# Patient Record
Sex: FEMALE | Race: ASIAN | Employment: UNEMPLOYED | URBAN - METROPOLITAN AREA
[De-identification: names, ages, dates, MRNs, and addresses within clinical notes are randomized per-mention and may not be internally consistent; named-entity substitution may affect disease eponyms.]

---

## 2020-02-05 ENCOUNTER — HOSPITAL ENCOUNTER (EMERGENCY)
Age: 45
Discharge: HOME | End: 2020-02-05
Attending: EMERGENCY MEDICINE
Payer: COMMERCIAL

## 2020-02-05 ENCOUNTER — APPOINTMENT (OUTPATIENT)
Dept: GENERAL RADIOLOGY | Age: 45
End: 2020-02-05
Payer: COMMERCIAL

## 2020-02-05 VITALS
RESPIRATION RATE: 16 BRPM | OXYGEN SATURATION: 100 % | HEART RATE: 78 BPM | WEIGHT: 135 LBS | DIASTOLIC BLOOD PRESSURE: 111 MMHG | BODY MASS INDEX: 25.49 KG/M2 | TEMPERATURE: 98.1 F | SYSTOLIC BLOOD PRESSURE: 179 MMHG | HEIGHT: 61 IN

## 2020-02-05 LAB
ANION GAP SERPL CALCULATED.3IONS-SCNC: 15 MMOL/L (ref 3–16)
BANDED NEUTROPHILS RELATIVE PERCENT: 2 % (ref 0–7)
BASOPHILS ABSOLUTE: 0.1 K/UL (ref 0–0.2)
BASOPHILS RELATIVE PERCENT: 1 %
BUN BLDV-MCNC: 4 MG/DL (ref 7–20)
CALCIUM SERPL-MCNC: 8.6 MG/DL (ref 8.3–10.6)
CHLORIDE BLD-SCNC: 104 MMOL/L (ref 99–110)
CO2: 22 MMOL/L (ref 21–32)
CREAT SERPL-MCNC: 0.5 MG/DL (ref 0.6–1.1)
EKG ATRIAL RATE: 81 BPM
EKG DIAGNOSIS: NORMAL
EKG P AXIS: 9 DEGREES
EKG P-R INTERVAL: 124 MS
EKG Q-T INTERVAL: 384 MS
EKG QRS DURATION: 74 MS
EKG QTC CALCULATION (BAZETT): 446 MS
EKG R AXIS: 1 DEGREES
EKG T AXIS: 1 DEGREES
EKG VENTRICULAR RATE: 81 BPM
EOSINOPHILS ABSOLUTE: 1.2 K/UL (ref 0–0.6)
EOSINOPHILS RELATIVE PERCENT: 11 %
GFR AFRICAN AMERICAN: >60
GFR NON-AFRICAN AMERICAN: >60
GLUCOSE BLD-MCNC: 139 MG/DL (ref 70–99)
HCT VFR BLD CALC: 44.8 % (ref 36–48)
HEMOGLOBIN: 15.1 G/DL (ref 12–16)
LYMPHOCYTES ABSOLUTE: 3.6 K/UL (ref 1–5.1)
LYMPHOCYTES RELATIVE PERCENT: 33 %
MCH RBC QN AUTO: 29.9 PG (ref 26–34)
MCHC RBC AUTO-ENTMCNC: 33.7 G/DL (ref 31–36)
MCV RBC AUTO: 88.7 FL (ref 80–100)
MONOCYTES ABSOLUTE: 0.4 K/UL (ref 0–1.3)
MONOCYTES RELATIVE PERCENT: 4 %
NEUTROPHILS ABSOLUTE: 5.6 K/UL (ref 1.7–7.7)
NEUTROPHILS RELATIVE PERCENT: 49 %
PDW BLD-RTO: 13.4 % (ref 12.4–15.4)
PLATELET # BLD: 313 K/UL (ref 135–450)
PMV BLD AUTO: 9.6 FL (ref 5–10.5)
POTASSIUM SERPL-SCNC: 4 MMOL/L (ref 3.5–5.1)
RBC # BLD: 5.05 M/UL (ref 4–5.2)
RBC # BLD: NORMAL 10*6/UL
SODIUM BLD-SCNC: 141 MMOL/L (ref 136–145)
TROPONIN: <0.01 NG/ML
WBC # BLD: 10.9 K/UL (ref 4–11)

## 2020-02-05 PROCEDURE — 80048 BASIC METABOLIC PNL TOTAL CA: CPT

## 2020-02-05 PROCEDURE — 84484 ASSAY OF TROPONIN QUANT: CPT

## 2020-02-05 PROCEDURE — 93010 ELECTROCARDIOGRAM REPORT: CPT | Performed by: INTERNAL MEDICINE

## 2020-02-05 PROCEDURE — 99284 EMERGENCY DEPT VISIT MOD MDM: CPT

## 2020-02-05 PROCEDURE — 93005 ELECTROCARDIOGRAM TRACING: CPT | Performed by: EMERGENCY MEDICINE

## 2020-02-05 PROCEDURE — 85025 COMPLETE CBC W/AUTO DIFF WBC: CPT

## 2020-02-05 PROCEDURE — 71046 X-RAY EXAM CHEST 2 VIEWS: CPT

## 2020-02-05 RX ORDER — AMLODIPINE BESYLATE AND BENAZEPRIL HYDROCHLORIDE 10; 40 MG/1; MG/1
CAPSULE ORAL DAILY
Status: ON HOLD | COMMUNITY
Start: 2020-01-15 | End: 2020-10-14 | Stop reason: HOSPADM

## 2020-02-05 RX ORDER — AMOXICILLIN AND CLAVULANATE POTASSIUM 875; 125 MG/1; MG/1
1 TABLET, FILM COATED ORAL 2 TIMES DAILY
Qty: 20 TABLET | Refills: 0 | Status: SHIPPED | OUTPATIENT
Start: 2020-02-05 | End: 2020-02-15

## 2020-02-05 RX ORDER — HYDROCHLOROTHIAZIDE 25 MG/1
25 TABLET ORAL DAILY
Status: ON HOLD | COMMUNITY
Start: 2020-01-14 | End: 2020-10-14 | Stop reason: HOSPADM

## 2020-02-05 RX ORDER — FLUTICASONE PROPIONATE 50 MCG
2 SPRAY, SUSPENSION (ML) NASAL DAILY
Qty: 1 BOTTLE | Refills: 0 | Status: SHIPPED | OUTPATIENT
Start: 2020-02-05

## 2020-02-05 RX ORDER — DEXTROMETHORPHAN POLISTIREX 30 MG/5ML
60 SUSPENSION ORAL 2 TIMES DAILY PRN
Qty: 1 BOTTLE | Refills: 0 | Status: SHIPPED | OUTPATIENT
Start: 2020-02-05 | End: 2020-02-15

## 2020-02-05 ASSESSMENT — PAIN DESCRIPTION - DESCRIPTORS: DESCRIPTORS: ACHING

## 2020-02-05 ASSESSMENT — PAIN DESCRIPTION - PAIN TYPE: TYPE: ACUTE PAIN

## 2020-02-05 ASSESSMENT — PAIN DESCRIPTION - LOCATION: LOCATION: CHEST

## 2020-02-05 NOTE — ED PROVIDER NOTES
eMERGENCY dEPARTMENT eNCOUnter      PtName: Ivonne Nagy  MRN: [id number]  Birthdate 1975  Date of evaluation: 2/5/2020  Provider: Fe [address]       Chief Complaint   Patient presents with    Fever     c/o fever, cough and headache. x month          HISTORY OF PRESENT ILLNESS   (Location/Symptom, Timing/Onset,Context/Setting, Quality, Duration, Modifying Factors, Severity) Note limiting factors. HPI    Ivonne Nagy is a 38 y.o. female who presents to the emergency department chief complaint of nasal congestion, sinus pressure and dry cough and nasal drainage worse at night over the past month. No associated fever. No chest pain. Nursing Notes were reviewed. REVIEW OF SYSTEMS    (2+ forlevel 4; 10+ for level 5)     Review of Systems  See hpi for further details. Complete 10 point review of all systems performed and is otherwise negative unless noted above. PAST MEDICAL HISTORY     Past Medical History:   Diagnosis Date    GERD (gastroesophageal reflux disease)     Hyperlipidemia     Hypertension        SURGICAL HISTORY     No past surgical history on file. CURRENT MEDICATIONS       Previous Medications    AMLODIPINE-BENAZEPRIL (LOTREL) 10-40 MG PER CAPSULE    daily    HYDROCHLOROTHIAZIDE (HYDRODIURIL) 25 MG TABLET    Take 25 mg by mouth daily    OMEPRAZOLE (PRILOSEC) 20 MG CAPSULE    Take 20 mg by mouth daily    ONDANSETRON (ZOFRAN) 4 MG TABLET    Take 1 tablet by mouth every 8 hours as needed for Nausea    PANTOPRAZOLE (PROTONIX) 20 MG TABLET    Take 2 tablets by mouth daily    TRAMADOL (ULTRAM) 50 MG TABLET    Take 1 tablet by mouth every 6 hours as needed for Pain       ALLERGIES     Asa [aspirin]    FAMILY HISTORY     No family history on file.        SOCIAL HISTORY       Social History     Socioeconomic History    Marital status:      Spouse name: Not on file    Number of children: Not on file    Years of education: Not on file    Highest education level: Not on file   Occupational History    Not on file   Social Needs    Financial resource strain: Not on file    Food insecurity:     Worry: Not on file     Inability: Not on file    Transportation needs:     Medical: Not on file     Non-medical: Not on file   Tobacco Use    Smoking status: Never Smoker   Substance and Sexual Activity    Alcohol use: No    Drug use: No    Sexual activity: Not on file   Lifestyle    Physical activity:     Days per week: Not on file     Minutes per session: Not on file    Stress: Not on file   Relationships    Social connections:     Talks on phone: Not on file     Gets together: Not on file     Attends [employer] service: Not on file     Active member of club or organization: Not on file     Attends meetings of clubs or organizations: Not on file     Relationship status: Not on file    Intimate partner violence:     Fear of current or ex partner: Not on file     Emotionally abused: Not on file     Physically abused: Not on file     Forced sexual activity: Not on file   Other Topics Concern    Not on file   Social History Narrative    Not on file       SCREENINGS           PHYSICAL EXAM    (5+ for level 4, 8+ for level 5)     ED Triage Vitals   BP Temp Temp Source Pulse Resp SpO2 Height Weight   02/05/20 0912 02/05/20 0910 02/05/20 0910 02/05/20 0910 02/05/20 0910 02/05/20 0910 02/05/20 0910 02/05/20 0910   (!) 185/105 98.1 °F (36.7 °C) Oral 79 22 98 % 5' 1\" (1.549 m) 135 lb (61.2 kg)       Physical Exam  Vitals signs and nursing note reviewed. Constitutional:       General: She is not in acute distress. Appearance: Normal appearance. She is not toxic-appearing or diaphoretic. HENT:      Head: Normocephalic and atraumatic. Right Ear: Tympanic membrane, ear canal and external ear normal.      Left Ear: Tympanic membrane, ear canal and external ear normal.      Nose: Congestion and rhinorrhea present.       Comments: Boggy inferior turbinates Mouth/Throat:      Mouth: Mucous membranes are moist.      Pharynx: Oropharynx is clear. No oropharyngeal exudate or posterior oropharyngeal erythema. Comments: Postnasal drip present  Eyes:      General: No scleral icterus. Right eye: No discharge. Left eye: No discharge. Extraocular Movements: Extraocular movements intact. Conjunctiva/sclera: Conjunctivae normal.      Pupils: Pupils are equal, round, and reactive to light. Neck:      Musculoskeletal: Normal range of motion and neck supple. Cardiovascular:      Rate and Rhythm: Normal rate and regular rhythm. Pulses: Normal pulses. Heart sounds: Normal heart sounds. No murmur. No friction rub. No gallop. Pulmonary:      Effort: Pulmonary effort is normal. No respiratory distress. Breath sounds: Normal breath sounds. No stridor. No wheezing, rhonchi or rales. Chest:      Chest wall: No tenderness. Abdominal:      General: Abdomen is flat. There is no distension. Tenderness: There is no abdominal tenderness. Musculoskeletal: Normal range of motion. General: No swelling, tenderness or deformity. Right lower leg: No edema. Left lower leg: No edema. Skin:     General: Skin is warm and dry. Capillary Refill: Capillary refill takes less than 2 seconds. Coloration: Skin is not jaundiced or pale. Findings: No bruising, erythema or rash. Neurological:      General: No focal deficit present. Mental Status: She is alert and oriented to person, place, and time. Cranial Nerves: No cranial nerve deficit. Sensory: No sensory deficit. Motor: No weakness. Psychiatric:         Mood and Affect: Mood normal.         Behavior: Behavior normal.         Thought Content:  Thought content normal.         Judgment: Judgment normal.         DIAGNOSTIC RESULTS     EKG (Per Emergency Physician):   EKG interpretation by ED physician: Normal axis, normal sinus rhythm at a rate

## 2020-10-11 ENCOUNTER — HOSPITAL ENCOUNTER (INPATIENT)
Age: 45
LOS: 1 days | Discharge: HOME | DRG: 111 | End: 2020-10-14
Attending: STUDENT IN AN ORGANIZED HEALTH CARE EDUCATION/TRAINING PROGRAM | Admitting: FAMILY MEDICINE
Payer: COMMERCIAL

## 2020-10-11 ENCOUNTER — APPOINTMENT (OUTPATIENT)
Dept: CT IMAGING | Age: 45
DRG: 111 | End: 2020-10-11
Payer: COMMERCIAL

## 2020-10-11 ENCOUNTER — APPOINTMENT (OUTPATIENT)
Dept: GENERAL RADIOLOGY | Age: 45
DRG: 111 | End: 2020-10-11
Payer: COMMERCIAL

## 2020-10-11 PROBLEM — R42 DIZZINESS: Status: ACTIVE | Noted: 2020-10-11

## 2020-10-11 LAB
A/G RATIO: 1.5 (ref 1.1–2.2)
ALBUMIN SERPL-MCNC: 4.5 G/DL (ref 3.4–5)
ALP BLD-CCNC: 91 U/L (ref 40–129)
ALT SERPL-CCNC: 52 U/L (ref 10–40)
ANION GAP SERPL CALCULATED.3IONS-SCNC: 15 MMOL/L (ref 3–16)
AST SERPL-CCNC: 54 U/L (ref 15–37)
BACTERIA: ABNORMAL /HPF
BASE EXCESS VENOUS: -5.8 MMOL/L (ref -3–3)
BASOPHILS ABSOLUTE: 0.1 K/UL (ref 0–0.2)
BASOPHILS RELATIVE PERCENT: 0.6 %
BILIRUB SERPL-MCNC: 0.9 MG/DL (ref 0–1)
BILIRUBIN URINE: NEGATIVE
BLOOD, URINE: ABNORMAL
BUN BLDV-MCNC: 6 MG/DL (ref 7–20)
CALCIUM SERPL-MCNC: 9.4 MG/DL (ref 8.3–10.6)
CARBOXYHEMOGLOBIN: 1.9 % (ref 0–1.5)
CHLORIDE BLD-SCNC: 101 MMOL/L (ref 99–110)
CLARITY: CLEAR
CO2: 18 MMOL/L (ref 21–32)
COLOR: YELLOW
CREAT SERPL-MCNC: 0.6 MG/DL (ref 0.6–1.1)
EOSINOPHILS ABSOLUTE: 0.2 K/UL (ref 0–0.6)
EOSINOPHILS RELATIVE PERCENT: 1.9 %
EPITHELIAL CELLS, UA: ABNORMAL /HPF (ref 0–5)
GFR AFRICAN AMERICAN: >60
GFR NON-AFRICAN AMERICAN: >60
GLOBULIN: 3 G/DL
GLUCOSE BLD-MCNC: 172 MG/DL (ref 70–99)
GLUCOSE URINE: NEGATIVE MG/DL
HCG QUALITATIVE: NEGATIVE
HCO3 VENOUS: 18.9 MMOL/L (ref 23–29)
HCT VFR BLD CALC: 40.9 % (ref 36–48)
HEMOGLOBIN: 14.1 G/DL (ref 12–16)
INR BLD: 0.96 (ref 0.86–1.14)
KETONES, URINE: NEGATIVE MG/DL
LACTIC ACID, SEPSIS: 2.8 MMOL/L (ref 0.4–1.9)
LACTIC ACID, SEPSIS: 2.9 MMOL/L (ref 0.4–1.9)
LEUKOCYTE ESTERASE, URINE: ABNORMAL
LIPASE: 30 U/L (ref 13–60)
LYMPHOCYTES ABSOLUTE: 2.8 K/UL (ref 1–5.1)
LYMPHOCYTES RELATIVE PERCENT: 22.2 %
MCH RBC QN AUTO: 30.9 PG (ref 26–34)
MCHC RBC AUTO-ENTMCNC: 34.5 G/DL (ref 31–36)
MCV RBC AUTO: 89.7 FL (ref 80–100)
METHEMOGLOBIN VENOUS: 0.6 %
MICROSCOPIC EXAMINATION: YES
MONOCYTES ABSOLUTE: 0.6 K/UL (ref 0–1.3)
MONOCYTES RELATIVE PERCENT: 4.8 %
NEUTROPHILS ABSOLUTE: 8.9 K/UL (ref 1.7–7.7)
NEUTROPHILS RELATIVE PERCENT: 70.5 %
NITRITE, URINE: NEGATIVE
O2 CONTENT, VEN: 19 VOL %
O2 SAT, VEN: 99 %
O2 THERAPY: ABNORMAL
PCO2, VEN: 33.9 MMHG (ref 40–50)
PDW BLD-RTO: 12.9 % (ref 12.4–15.4)
PH UA: 6 (ref 5–8)
PH VENOUS: 7.36 (ref 7.35–7.45)
PLATELET # BLD: 290 K/UL (ref 135–450)
PMV BLD AUTO: 9.3 FL (ref 5–10.5)
PO2, VEN: 102 MMHG (ref 25–40)
POTASSIUM REFLEX MAGNESIUM: 3.7 MMOL/L (ref 3.5–5.1)
PRO-BNP: 19 PG/ML (ref 0–124)
PROTEIN UA: NEGATIVE MG/DL
PROTHROMBIN TIME: 11.1 SEC (ref 10–13.2)
RBC # BLD: 4.56 M/UL (ref 4–5.2)
RBC UA: ABNORMAL /HPF (ref 0–4)
SODIUM BLD-SCNC: 134 MMOL/L (ref 136–145)
SPECIFIC GRAVITY UA: <1.005 (ref 1–1.03)
TCO2 CALC VENOUS: 45 MMOL/L
TOTAL PROTEIN: 7.5 G/DL (ref 6.4–8.2)
TROPONIN: <0.01 NG/ML
TROPONIN: <0.01 NG/ML
URINE REFLEX TO CULTURE: ABNORMAL
URINE TYPE: ABNORMAL
UROBILINOGEN, URINE: 0.2 E.U./DL
WBC # BLD: 12.6 K/UL (ref 4–11)
WBC UA: ABNORMAL /HPF (ref 0–5)

## 2020-10-11 PROCEDURE — 85025 COMPLETE CBC W/AUTO DIFF WBC: CPT

## 2020-10-11 PROCEDURE — 83690 ASSAY OF LIPASE: CPT

## 2020-10-11 PROCEDURE — 84703 CHORIONIC GONADOTROPIN ASSAY: CPT

## 2020-10-11 PROCEDURE — G0378 HOSPITAL OBSERVATION PER HR: HCPCS

## 2020-10-11 PROCEDURE — 87040 BLOOD CULTURE FOR BACTERIA: CPT

## 2020-10-11 PROCEDURE — 82803 BLOOD GASES ANY COMBINATION: CPT

## 2020-10-11 PROCEDURE — 6370000000 HC RX 637 (ALT 250 FOR IP): Performed by: PHYSICIAN ASSISTANT

## 2020-10-11 PROCEDURE — 71045 X-RAY EXAM CHEST 1 VIEW: CPT

## 2020-10-11 PROCEDURE — 96374 THER/PROPH/DIAG INJ IV PUSH: CPT

## 2020-10-11 PROCEDURE — 84484 ASSAY OF TROPONIN QUANT: CPT

## 2020-10-11 PROCEDURE — 70450 CT HEAD/BRAIN W/O DYE: CPT

## 2020-10-11 PROCEDURE — 2580000003 HC RX 258: Performed by: PHYSICIAN ASSISTANT

## 2020-10-11 PROCEDURE — 6370000000 HC RX 637 (ALT 250 FOR IP): Performed by: FAMILY MEDICINE

## 2020-10-11 PROCEDURE — 94760 N-INVAS EAR/PLS OXIMETRY 1: CPT

## 2020-10-11 PROCEDURE — 71260 CT THORAX DX C+: CPT

## 2020-10-11 PROCEDURE — 80053 COMPREHEN METABOLIC PANEL: CPT

## 2020-10-11 PROCEDURE — 85610 PROTHROMBIN TIME: CPT

## 2020-10-11 PROCEDURE — 6360000004 HC RX CONTRAST MEDICATION: Performed by: PHYSICIAN ASSISTANT

## 2020-10-11 PROCEDURE — 6360000002 HC RX W HCPCS: Performed by: PHYSICIAN ASSISTANT

## 2020-10-11 PROCEDURE — 81001 URINALYSIS AUTO W/SCOPE: CPT

## 2020-10-11 PROCEDURE — 83880 ASSAY OF NATRIURETIC PEPTIDE: CPT

## 2020-10-11 PROCEDURE — 83605 ASSAY OF LACTIC ACID: CPT

## 2020-10-11 PROCEDURE — 2580000003 HC RX 258: Performed by: FAMILY MEDICINE

## 2020-10-11 PROCEDURE — 99285 EMERGENCY DEPT VISIT HI MDM: CPT

## 2020-10-11 PROCEDURE — 96375 TX/PRO/DX INJ NEW DRUG ADDON: CPT

## 2020-10-11 PROCEDURE — 93005 ELECTROCARDIOGRAM TRACING: CPT | Performed by: PHYSICIAN ASSISTANT

## 2020-10-11 RX ORDER — DIPHENHYDRAMINE HYDROCHLORIDE 50 MG/ML
25 INJECTION INTRAMUSCULAR; INTRAVENOUS ONCE
Status: COMPLETED | OUTPATIENT
Start: 2020-10-11 | End: 2020-10-11

## 2020-10-11 RX ORDER — LORATADINE 10 MG/1
10 CAPSULE, LIQUID FILLED ORAL DAILY
COMMUNITY

## 2020-10-11 RX ORDER — TOPIRAMATE 100 MG/1
100 TABLET, FILM COATED ORAL 2 TIMES DAILY
COMMUNITY

## 2020-10-11 RX ORDER — AMLODIPINE BESYLATE 10 MG/1
10 TABLET ORAL DAILY
Status: ON HOLD | COMMUNITY
End: 2020-10-14 | Stop reason: HOSPADM

## 2020-10-11 RX ORDER — SODIUM CHLORIDE 0.9 % (FLUSH) 0.9 %
10 SYRINGE (ML) INJECTION EVERY 12 HOURS SCHEDULED
Status: DISCONTINUED | OUTPATIENT
Start: 2020-10-11 | End: 2020-10-14 | Stop reason: HOSPADM

## 2020-10-11 RX ORDER — LANOLIN ALCOHOL/MO/W.PET/CERES
3 CREAM (GRAM) TOPICAL NIGHTLY PRN
COMMUNITY

## 2020-10-11 RX ORDER — ASPIRIN 81 MG/1
324 TABLET, CHEWABLE ORAL ONCE
Status: COMPLETED | OUTPATIENT
Start: 2020-10-11 | End: 2020-10-11

## 2020-10-11 RX ORDER — PROMETHAZINE HYDROCHLORIDE 25 MG/1
12.5 TABLET ORAL EVERY 6 HOURS PRN
Status: DISCONTINUED | OUTPATIENT
Start: 2020-10-11 | End: 2020-10-14 | Stop reason: HOSPADM

## 2020-10-11 RX ORDER — AMLODIPINE BESYLATE AND BENAZEPRIL HYDROCHLORIDE 10; 40 MG/1; MG/1
1 CAPSULE ORAL DAILY
Status: DISCONTINUED | OUTPATIENT
Start: 2020-10-12 | End: 2020-10-11 | Stop reason: CLARIF

## 2020-10-11 RX ORDER — ONDANSETRON 2 MG/ML
4 INJECTION INTRAMUSCULAR; INTRAVENOUS ONCE
Status: COMPLETED | OUTPATIENT
Start: 2020-10-11 | End: 2020-10-11

## 2020-10-11 RX ORDER — METOCLOPRAMIDE HYDROCHLORIDE 5 MG/ML
10 INJECTION INTRAMUSCULAR; INTRAVENOUS ONCE
Status: COMPLETED | OUTPATIENT
Start: 2020-10-11 | End: 2020-10-11

## 2020-10-11 RX ORDER — AMLODIPINE BESYLATE 5 MG/1
10 TABLET ORAL DAILY
Status: DISCONTINUED | OUTPATIENT
Start: 2020-10-12 | End: 2020-10-13

## 2020-10-11 RX ORDER — MORPHINE SULFATE 2 MG/ML
2 INJECTION, SOLUTION INTRAMUSCULAR; INTRAVENOUS ONCE
Status: COMPLETED | OUTPATIENT
Start: 2020-10-11 | End: 2020-10-11

## 2020-10-11 RX ORDER — LISINOPRIL 20 MG/1
40 TABLET ORAL DAILY
Status: DISCONTINUED | OUTPATIENT
Start: 2020-10-12 | End: 2020-10-14 | Stop reason: HOSPADM

## 2020-10-11 RX ORDER — OMEPRAZOLE 10 MG/1
20 CAPSULE, DELAYED RELEASE ORAL DAILY
Status: DISCONTINUED | OUTPATIENT
Start: 2020-10-12 | End: 2020-10-11 | Stop reason: CLARIF

## 2020-10-11 RX ORDER — PANTOPRAZOLE SODIUM 40 MG/1
40 TABLET, DELAYED RELEASE ORAL DAILY
Status: DISCONTINUED | OUTPATIENT
Start: 2020-10-12 | End: 2020-10-14 | Stop reason: HOSPADM

## 2020-10-11 RX ORDER — ONDANSETRON 2 MG/ML
4 INJECTION INTRAMUSCULAR; INTRAVENOUS EVERY 6 HOURS PRN
Status: DISCONTINUED | OUTPATIENT
Start: 2020-10-11 | End: 2020-10-14 | Stop reason: HOSPADM

## 2020-10-11 RX ORDER — POLYETHYLENE GLYCOL 3350 17 G/17G
17 POWDER, FOR SOLUTION ORAL DAILY PRN
Status: DISCONTINUED | OUTPATIENT
Start: 2020-10-11 | End: 2020-10-14 | Stop reason: HOSPADM

## 2020-10-11 RX ORDER — 0.9 % SODIUM CHLORIDE 0.9 %
1000 INTRAVENOUS SOLUTION INTRAVENOUS ONCE
Status: COMPLETED | OUTPATIENT
Start: 2020-10-11 | End: 2020-10-11

## 2020-10-11 RX ORDER — AMITRIPTYLINE HYDROCHLORIDE 10 MG/1
25 TABLET, FILM COATED ORAL NIGHTLY
COMMUNITY

## 2020-10-11 RX ORDER — SODIUM CHLORIDE 0.9 % (FLUSH) 0.9 %
10 SYRINGE (ML) INJECTION PRN
Status: DISCONTINUED | OUTPATIENT
Start: 2020-10-11 | End: 2020-10-14 | Stop reason: HOSPADM

## 2020-10-11 RX ORDER — TRAMADOL HYDROCHLORIDE 50 MG/1
50 TABLET ORAL EVERY 4 HOURS PRN
Status: DISCONTINUED | OUTPATIENT
Start: 2020-10-11 | End: 2020-10-14 | Stop reason: HOSPADM

## 2020-10-11 RX ORDER — ACETAMINOPHEN 325 MG/1
650 TABLET ORAL EVERY 6 HOURS PRN
Status: DISCONTINUED | OUTPATIENT
Start: 2020-10-11 | End: 2020-10-14 | Stop reason: HOSPADM

## 2020-10-11 RX ORDER — ACETAMINOPHEN 650 MG/1
650 SUPPOSITORY RECTAL EVERY 6 HOURS PRN
Status: DISCONTINUED | OUTPATIENT
Start: 2020-10-11 | End: 2020-10-14 | Stop reason: HOSPADM

## 2020-10-11 RX ORDER — HYDROCHLOROTHIAZIDE 25 MG/1
25 TABLET ORAL DAILY
Status: DISCONTINUED | OUTPATIENT
Start: 2020-10-12 | End: 2020-10-13

## 2020-10-11 RX ADMIN — TRAMADOL HYDROCHLORIDE 50 MG: 50 TABLET, FILM COATED ORAL at 22:32

## 2020-10-11 RX ADMIN — IOPAMIDOL 75 ML: 755 INJECTION, SOLUTION INTRAVENOUS at 18:06

## 2020-10-11 RX ADMIN — Medication 10 ML: at 21:46

## 2020-10-11 RX ADMIN — MORPHINE SULFATE 2 MG: 2 INJECTION, SOLUTION INTRAMUSCULAR; INTRAVENOUS at 17:33

## 2020-10-11 RX ADMIN — ONDANSETRON 4 MG: 2 INJECTION INTRAMUSCULAR; INTRAVENOUS at 17:30

## 2020-10-11 RX ADMIN — NITROGLYCERIN 1 INCH: 20 OINTMENT TOPICAL at 17:31

## 2020-10-11 RX ADMIN — ASPIRIN 324 MG: 81 TABLET, CHEWABLE ORAL at 17:31

## 2020-10-11 RX ADMIN — METOCLOPRAMIDE 10 MG: 5 INJECTION, SOLUTION INTRAMUSCULAR; INTRAVENOUS at 17:31

## 2020-10-11 RX ADMIN — SODIUM CHLORIDE 1000 ML: 9 INJECTION, SOLUTION INTRAVENOUS at 17:30

## 2020-10-11 RX ADMIN — DIPHENHYDRAMINE HYDROCHLORIDE 25 MG: 50 INJECTION, SOLUTION INTRAMUSCULAR; INTRAVENOUS at 17:30

## 2020-10-11 ASSESSMENT — PAIN DESCRIPTION - PROGRESSION: CLINICAL_PROGRESSION: GRADUALLY IMPROVING

## 2020-10-11 ASSESSMENT — PAIN DESCRIPTION - LOCATION
LOCATION: CHEST
LOCATION: CHEST

## 2020-10-11 ASSESSMENT — ENCOUNTER SYMPTOMS
ABDOMINAL PAIN: 0
VOMITING: 0
CHEST TIGHTNESS: 0
NAUSEA: 0
DIARRHEA: 0
COUGH: 1
SHORTNESS OF BREATH: 1

## 2020-10-11 ASSESSMENT — PAIN DESCRIPTION - DESCRIPTORS
DESCRIPTORS: BURNING
DESCRIPTORS: ACHING

## 2020-10-11 ASSESSMENT — PAIN DESCRIPTION - PAIN TYPE
TYPE: ACUTE PAIN
TYPE: ACUTE PAIN

## 2020-10-11 ASSESSMENT — PAIN SCALES - GENERAL
PAINLEVEL_OUTOF10: 3
PAINLEVEL_OUTOF10: 5
PAINLEVEL_OUTOF10: 5
PAINLEVEL_OUTOF10: 7
PAINLEVEL_OUTOF10: 7

## 2020-10-11 ASSESSMENT — HEART SCORE: ECG: 1

## 2020-10-11 NOTE — ED PROVIDER NOTES
[facility]        Pt Name: Hui Robbins  MRN: [id number]  [place] 1975  Date of evaluation: 10/11/2020  Provider: Tevin Cheung PA-C  PCP: No primary care provider on file. I have seen and evaluated this patient with my supervising physician Luc Jones MD.    [facility]       Chief Complaint   Patient presents with    Dizziness     Pt brought in per [facility] for multiple complaints pt did have Covid in August but over the last 2 weeks she has not felt well, dizziness, sob, fatigue, burning sensation. HISTORY OF PRESENT ILLNESS   (Location, Timing/Onset, Context/Setting, Quality, Duration, Modifying Factors, Severity, Associated Signs and Symptoms)  Note limiting factors. Hui Robbins is a 38 y.o. female presents the emergency department today via [facility] emergency medical services from the home environment with multiple complaints. It is reported that she continues not to feel well. It is reported that she has had ongoing difficulties ever since she was diagnosed with COVID in August.  She is had difficulties with symptoms of fatigue, malaise, chest pain, shortness of breath, intermittent cough, dizziness, it occurred in that timeframe. She is had previous emergency department visits. Is reported the worsening of her symptoms today seem to be relations of shortness of breath. Patient states she is never had a history of DVT and or PE. She denies additional risk factors other than COVID as a potential cause for thromboembolic events. She states that she is having dizziness and unfortunate because of language barrier cannot discern if this is truly vertiginous dizziness and/or lightheadedness. She states that her current level of pain and discomfort rates to be 7 out of 10. Is with the above-mentioned she presents to the ED for evaluation and treatment.   It is reported that she did PANTOPRAZOLE (PROTONIX) 20 MG TABLET    Take 2 tablets by mouth daily    TRAMADOL (ULTRAM) 50 MG TABLET    Take 1 tablet by mouth every 6 hours as needed for Pain         ALLERGIES     Asa [aspirin]    FAMILYHISTORY     History reviewed. No pertinent family history. SOCIAL HISTORY       Social History     Tobacco Use    Smoking status: Never Smoker    Smokeless tobacco: Never Used   Substance Use Topics    Alcohol use: No    Drug use: No       SCREENINGS      Heart Score for chest pain patients  History: Highly Suspicious  ECG: Non-Specifc repolarization disturbance/LBTB/PM  Patient Age: > 39 and < 65 years  *Risk factors for Atherosclerotic disease: Hypercholesterolemia, Hypertension, Obesity  Risk Factors: > 3 Risk factors or history of atherosclerotic disease*  Troponin: < 1X normal limit  Heart Score Total: 6      PHYSICAL EXAM    (up to 7 for level 4, 8 or more for level 5)     ED Triage Vitals [10/11/20 1654]   BP Temp Temp Source Pulse Resp SpO2 Height Weight   129/87 98.7 °F (37.1 °C) Oral 110 19 99 % 5' 1\" (1.549 m) 145 lb (65.8 kg)       Physical Exam  Vitals signs and nursing note reviewed. Constitutional:       General: She is awake. She is not in acute distress. Appearance: Normal appearance. She is well-developed. She is not ill-appearing or diaphoretic. HENT:      Head: Normocephalic and atraumatic. No raccoon eyes, Marcum's sign or laceration. Right Ear: Hearing and external ear normal.      Left Ear: Hearing and external ear normal.      Nose: Nose normal.      Mouth/Throat:      Lips: Pink. Mouth: Mucous membranes are moist.   Eyes:      General: Lids are normal. No scleral icterus. Right eye: No discharge. Left eye: No discharge. Conjunctiva/sclera: Conjunctivae normal.      Pupils: Pupils are equal, round, and reactive to light. Neck:      Musculoskeletal: Normal range of motion. Vascular: No JVD.    Cardiovascular:      Rate and Rhythm: Normal rate and regular rhythm. Heart sounds: No murmur. No friction rub. No gallop. Pulmonary:      Effort: Pulmonary effort is normal. No accessory muscle usage or respiratory distress. Breath sounds: Normal breath sounds. No wheezing, rhonchi or rales. Abdominal:      General: There is no distension. Palpations: Abdomen is soft. Abdomen is not rigid. There is no mass. Tenderness: There is no abdominal tenderness. There is no guarding or rebound. Skin:     General: Skin is warm and dry. Neurological:      General: No focal deficit present. Mental Status: She is alert and oriented to person, place, and time. GCS: GCS eye subscore is 4. GCS verbal subscore is 5. GCS motor subscore is 6. Cranial Nerves: Cranial nerves are intact. No cranial nerve deficit. Sensory: Sensation is intact. No sensory deficit. Motor: Motor function is intact. Coordination: Coordination is intact. Coordination normal.      Gait: Gait is intact. Deep Tendon Reflexes: Reflexes are normal and symmetric. Psychiatric:         Behavior: Behavior normal. Behavior is cooperative.          DIAGNOSTIC RESULTS   LABS:    Labs Reviewed   CBC WITH AUTO DIFFERENTIAL - Abnormal; Notable for the following components:       Result Value    WBC 12.6 (*)     Neutrophils Absolute 8.9 (*)     All other components within normal limits    Narrative:     Performed at:  [facility]  [address] [facility], [address]   Phone [phone]   COMPREHENSIVE METABOLIC PANEL W/ REFLEX TO MG FOR LOW K - Abnormal; Notable for the following components:    Sodium 134 (*)     CO2 18 (*)     Glucose 172 (*)     BUN 6 (*)     ALT 52 (*)     AST 54 (*)     All other components within normal limits    Narrative:     Performed at:  [facility]  [facility] [address]   Phone [phone]   LACTATE, SEPSIS - Abnormal; Notable for the QUALITATIVE    Narrative:     Performed at:  [facility]  [address], [address]   Phone [phone]   LACTATE, SEPSIS       All other labs were within normal range or not returned as of this dictation. EKG: All EKG's are interpreted by the Emergency Department Physician in the absence of a cardiologist.  Please see their note for interpretation of EKG. RADIOLOGY:   Non-plain film images such as CT, Ultrasound and MRI are read by the radiologist. Plain radiographic images are visualized and preliminarily interpreted by the ED Provider with the below findings:        Interpretation per the Radiologist below, if available at the time of this note:    CT CHEST PULMONARY EMBOLISM W CONTRAST   Final Result   Unremarkable appearance of the chest with no evidence of pulmonary embolism   clear lungs. Partially visualized likely hepatomegaly with severe fatty   infiltration. CT HEAD WO CONTRAST   Final Result   Negative noncontrast CT examination of the brain.          XR CHEST PORTABLE   Final Result   Negative portable chest.             PROCEDURES   Unless otherwise noted below, none     Procedures    CRITICAL CARE TIME   N/A    CONSULTS:  None      EMERGENCY DEPARTMENT COURSE and DIFFERENTIAL DIAGNOSIS/MDM:   Vitals:    Vitals:    10/11/20 1654 10/11/20 1746 10/11/20 1830 10/11/20 1900   BP: 129/87 (!) 137/90 110/81 103/72   Pulse: 110 94 92 90   Resp: 19 16 17 21   Temp: 98.7 °F (37.1 °C)      TempSrc: Oral      SpO2: 99% 98% 100% 99%   Weight: 145 lb (65.8 kg)      Height: 5' 1\" (1.549 m)          Patient was given the following medications:  Medications   0.9 % sodium chloride bolus (0 mLs Intravenous Stopped 10/11/20 1924)   ondansetron (ZOFRAN) injection 4 mg (4 mg Intravenous Given 10/11/20 1730)   metoclopramide (REGLAN) injection 10 mg (10 mg Intravenous Given 10/11/20 1731)   diphenhydrAMINE (BENADRYL) injection 25 mg (25 mg Intravenous Given 10/11/20 1730)   aspirin chewable tablet 324 mg (324 mg Oral Given 10/11/20 1731)   nitroglycerin (NITRO-BID) 2 % ointment 1 inch (1 inch Topical Given 10/11/20 1731)   morphine (PF) injection 2 mg (2 mg Intravenous Given 10/11/20 1733)   iopamidol (ISOVUE-370) 76 % injection 75 mL (75 mLs Intravenous Given 10/11/20 1806)           The patient's detailed history of present illness is documented as above. Upon arrival to the emergency department the patient's vital signs are as documented. The patient is noted to be hemodynamically stable and afebrile. Physical examination findings are as above. Access was obtained. Laboratory testing and work-up was initiated. Patient is not allergic to aspirin but instead gets nausea. Because of her chest pain as well as chest pain that was relieved with nitroglycerin she was given the balance of the aspirin here in the ED setting. Initial EKG performed upon arrival demonstrates a sinus tachycardia with a rate of 101. No evidence of acute ST elevation. Intervals and axes as documented. T wave abnormalities noted. Please see attending physician details for further EKG interpretation in comparison. Initial CBC returns with evidence of mild leukocytosis at 12.6 with no evidence of anemia and/or thrombocytopenia. BUN is 6 creatinine 0.6 nonfasting glucose mildly elevated at 172. Her sodium is 134 electrolytes are normal with the exception of CO2 low at 18 but her gap is 15 total bili is normal no evidence of transaminitis and or elevation or alkaline phosphatase. Opponent is less than 0.01.  proBNP is 19. INR is 0.96. Lipase is 30. Initial lactic acid returns elevated at 2.9 yet with no identifiable potential source. Venous blood gas demonstrates no evidence of acidosis. She is not hypercapnic or hypoxic. Urinalysis appears contaminated and does not appear consistent with infection. This will be cultured but will not be treated.   In light of the symptomatology imaging was indicated. CT of the head demonstrates no evidence of acute intercranial abnormality while CT PE study demonstrates no pulmonary embolism. There is evidence of likely hepatomegaly with severe fatty infiltration of the liver. Lung fields are otherwise clear and demonstrating no evidence of consolidation or edema. There is no evidence of effusion or pneumothorax. No identifiable source of the patient's lactic acidosis. She did receive a liter of fluid. This will be repeated for trending purposes but is not concerning at present for sepsis. Patient will be admitted to medical surgical services for ongoing care management the diagnoses below. FINAL IMPRESSION      1. Chest pain, unspecified type relieved by nitroglycerin    2. SIRS (systemic inflammatory response syndrome) (HCC)    3. Lactic acidosis    4. Post viral syndrome    5. Cardiovascular risk factor    6. Fatty liver    7.  Acute nonintractable headache, unspecified headache type          DISPOSITION/PLAN   DISPOSITION Decision To Admit 10/11/2020 07:46:52 PM       (Please note that portions of this note were completed with a voice recognition program.  Efforts were made to edit the dictations but occasionally words are mis-transcribed.)    Jian Hurst PA-C (electronically signed)           Oj Jerome PA-C  10/11/20 1952

## 2020-10-11 NOTE — ED PROVIDER NOTES
AST 54 (*)     All other components within normal limits    Narrative:     Performed at:  [facility] [address],  [place]   Phone [phone]   LACTATE, SEPSIS - Abnormal; Notable for the following components:    Lactic Acid, Sepsis 2.9 (*)     All other components within normal limits    Narrative:     Performed at:  [facility] [address]   Phone [phone]   LACTATE, SEPSIS - Abnormal; Notable for the following components:    Lactic Acid, Sepsis 2.8 (*)     All other components within normal limits    Narrative:     Performed at:  [facility] [address],  [place]   Phone [phone]   BLOOD GAS, VENOUS - Abnormal; Notable for the following components:    pCO2, Jose Alfredo 33.9 (*)     pO2, Jose Alfredo 102.0 (*)     HCO3, Venous 18.9 (*)     Base Excess, Jose Alfredo -5.8 (*)     Carboxyhemoglobin 1.9 (*)     All other components within normal limits    Narrative:     Performed at:  [facility] [address],  [place], [place]   Phone [phone]   URINE RT REFLEX TO CULTURE - Abnormal; Notable for the following components:    Blood, Urine MODERATE (*)     Leukocyte Esterase, Urine TRACE (*)     All other components within normal limits    Narrative:     Performed at:  [facility] [address],  [place]   Phone [phone]   MICROSCOPIC URINALYSIS - Abnormal; Notable for the following components:    Epithelial Cells, UA 6-10 (*)     Bacteria, UA Rare (*)     All other components within normal limits    Narrative:     Performed at:  [facility] [address],  [email or web address]   Phone [phone]   CULTURE, BLOOD 1   CULTURE, BLOOD 2   TROPONIN    Narrative:     Performed at:  [facility]  [address] [place] [address], [place] [employer]   Phone [phone]   BRAIN NATRIURETIC PEPTIDE    Narrative:     Performed at:  [facility]  [address] [facility] [address] [employer]   Phone [phone]   PROTIME-INR    Narrative:     Performed at:  [facility]  [address] [facility] [address] [employer]   Phone [phone]   LIPASE    Narrative:     Performed at:  [facility]  [address] [facility] [address] [employer]   Phone [phone]   HCG, SERUM, QUALITATIVE    Narrative:     Performed at:  [facility]  [address] [facility], [place] [employer]   Phone [phone]   TROPONIN   TROPONIN     Medications   amLODIPine-benazepril (LOTREL) 10-40 MG per capsule 1 capsule (has no administration in time range)   hydroCHLOROthiazide (HYDRODIURIL) tablet 25 mg (has no administration in time range)   omeprazole (PRILOSEC) delayed release capsule 20 mg (has no administration in time range)   pantoprazole (PROTONIX) tablet 40 mg (has no administration in time range)   traMADol (ULTRAM) tablet 50 mg (has no administration in time range)   sodium chloride flush 0.9 % injection 10 mL (has no administration in time range)   sodium chloride flush 0.9 % injection 10 mL (has no administration in time range)   acetaminophen (TYLENOL) tablet 650 mg (has no administration in time range)     Or   acetaminophen (TYLENOL) suppository 650 mg (has no administration in time range)   polyethylene glycol (GLYCOLAX) packet 17 g (has no administration in time range)   promethazine (PHENERGAN) tablet 12.5 mg (has no administration in time range)     Or   ondansetron (ZOFRAN) injection 4 mg (has no administration in time range)   enoxaparin (LOVENOX) injection 40 mg (has no administration in time range)   0.9 % sodium chloride bolus (0 mLs Intravenous Stopped 10/11/20 1924)   ondansetron (ZOFRAN) injection 4 mg (4 mg Intravenous Given 10/11/20 1730)   metoclopramide (REGLAN) injection 10 mg (10 mg Intravenous Given 10/11/20 1731)   diphenhydrAMINE (BENADRYL) injection 25 mg (25 mg Intravenous Given 10/11/20 1730)   aspirin chewable tablet 324 mg (324 mg Oral Given 10/11/20 1731)   nitroglycerin (NITRO-BID) 2 % ointment 1 inch (1 inch Topical Given 10/11/20 1731)   morphine (PF) injection 2 mg (2 mg Intravenous Given 10/11/20 1733)   iopamidol (ISOVUE-370) 76 % injection 75 mL (75 mLs Intravenous Given 10/11/20 1806)     Pt meets SIRS criteria and does have lactate elevation but an infectious source was not found today. We suspect post viral syndrome and will treat with IVF but I do not believe abx are currently indicated. She has no cardiac workup performed in the past and her chest pain today is concerning with resolution after nitro. We will admit for symptomatic control and further cardiac workup. She is agreeable to plan. Patient Referrals:  No follow-up provider specified. Discharge Medications:  Current Discharge Medication List          FINAL IMPRESSION  1. Chest pain, unspecified type relieved by nitroglycerin    2. SIRS (systemic inflammatory response syndrome) (HCC)    3. Lactic acidosis    4. Post viral syndrome    5. Cardiovascular risk factor    6. Fatty liver    7. Acute nonintractable headache, unspecified headache type        Blood pressure 102/67, pulse 88, temperature 98.7 °F (37.1 °C), temperature source Oral, resp. rate 19, height 5' 1\" (1.549 m), weight 145 lb (65.8 kg), SpO2 96 %.      For further details of [facility] emergency department encounter, please see documentation by advanced practice provider       Ryne Dacosta MD  10/11/20 2010

## 2020-10-12 LAB
ANION GAP SERPL CALCULATED.3IONS-SCNC: 12 MMOL/L (ref 3–16)
BUN BLDV-MCNC: 7 MG/DL (ref 7–20)
CALCIUM SERPL-MCNC: 9 MG/DL (ref 8.3–10.6)
CHLORIDE BLD-SCNC: 105 MMOL/L (ref 99–110)
CO2: 22 MMOL/L (ref 21–32)
CREAT SERPL-MCNC: 0.6 MG/DL (ref 0.6–1.1)
EKG ATRIAL RATE: 101 BPM
EKG DIAGNOSIS: NORMAL
EKG P AXIS: 47 DEGREES
EKG P-R INTERVAL: 144 MS
EKG Q-T INTERVAL: 360 MS
EKG QRS DURATION: 80 MS
EKG QTC CALCULATION (BAZETT): 466 MS
EKG R AXIS: 14 DEGREES
EKG T AXIS: 18 DEGREES
EKG VENTRICULAR RATE: 101 BPM
ESTIMATED AVERAGE GLUCOSE: 162.8 MG/DL
GFR AFRICAN AMERICAN: >60
GFR NON-AFRICAN AMERICAN: >60
GLUCOSE BLD-MCNC: 134 MG/DL (ref 70–99)
GLUCOSE BLD-MCNC: 155 MG/DL (ref 70–99)
GLUCOSE BLD-MCNC: 194 MG/DL (ref 70–99)
HBA1C MFR BLD: 7.3 %
HCT VFR BLD CALC: 36.7 % (ref 36–48)
HEMOGLOBIN: 12.8 G/DL (ref 12–16)
LACTIC ACID: 1.4 MMOL/L (ref 0.4–2)
LV EF: 76 %
LVEF MODALITY: NORMAL
MCH RBC QN AUTO: 31.2 PG (ref 26–34)
MCHC RBC AUTO-ENTMCNC: 34.9 G/DL (ref 31–36)
MCV RBC AUTO: 89.4 FL (ref 80–100)
PDW BLD-RTO: 13.1 % (ref 12.4–15.4)
PERFORMED ON: ABNORMAL
PERFORMED ON: ABNORMAL
PLATELET # BLD: 272 K/UL (ref 135–450)
PMV BLD AUTO: 9 FL (ref 5–10.5)
POTASSIUM SERPL-SCNC: 3.4 MMOL/L (ref 3.5–5.1)
RBC # BLD: 4.11 M/UL (ref 4–5.2)
SODIUM BLD-SCNC: 139 MMOL/L (ref 136–145)
TROPONIN: <0.01 NG/ML
WBC # BLD: 9.3 K/UL (ref 4–11)

## 2020-10-12 PROCEDURE — G0378 HOSPITAL OBSERVATION PER HR: HCPCS

## 2020-10-12 PROCEDURE — 6370000000 HC RX 637 (ALT 250 FOR IP): Performed by: FAMILY MEDICINE

## 2020-10-12 PROCEDURE — 83036 HEMOGLOBIN GLYCOSYLATED A1C: CPT

## 2020-10-12 PROCEDURE — 36415 COLL VENOUS BLD VENIPUNCTURE: CPT

## 2020-10-12 PROCEDURE — 6370000000 HC RX 637 (ALT 250 FOR IP): Performed by: INTERNAL MEDICINE

## 2020-10-12 PROCEDURE — 80048 BASIC METABOLIC PNL TOTAL CA: CPT

## 2020-10-12 PROCEDURE — 85027 COMPLETE CBC AUTOMATED: CPT

## 2020-10-12 PROCEDURE — 6360000002 HC RX W HCPCS: Performed by: INTERNAL MEDICINE

## 2020-10-12 PROCEDURE — 84484 ASSAY OF TROPONIN QUANT: CPT

## 2020-10-12 PROCEDURE — 3430000000 HC RX DIAGNOSTIC RADIOPHARMACEUTICAL: Performed by: INTERNAL MEDICINE

## 2020-10-12 PROCEDURE — 96376 TX/PRO/DX INJ SAME DRUG ADON: CPT

## 2020-10-12 PROCEDURE — 93010 ELECTROCARDIOGRAM REPORT: CPT | Performed by: INTERNAL MEDICINE

## 2020-10-12 PROCEDURE — 2580000003 HC RX 258: Performed by: FAMILY MEDICINE

## 2020-10-12 PROCEDURE — 78452 HT MUSCLE IMAGE SPECT MULT: CPT | Performed by: INTERNAL MEDICINE

## 2020-10-12 PROCEDURE — 93017 CV STRESS TEST TRACING ONLY: CPT | Performed by: INTERNAL MEDICINE

## 2020-10-12 PROCEDURE — 6360000002 HC RX W HCPCS: Performed by: FAMILY MEDICINE

## 2020-10-12 PROCEDURE — 96372 THER/PROPH/DIAG INJ SC/IM: CPT

## 2020-10-12 PROCEDURE — 83605 ASSAY OF LACTIC ACID: CPT

## 2020-10-12 PROCEDURE — A9502 TC99M TETROFOSMIN: HCPCS | Performed by: INTERNAL MEDICINE

## 2020-10-12 RX ORDER — INSULIN LISPRO 100 [IU]/ML
0-3 INJECTION, SOLUTION INTRAVENOUS; SUBCUTANEOUS NIGHTLY
Status: DISCONTINUED | OUTPATIENT
Start: 2020-10-12 | End: 2020-10-14 | Stop reason: HOSPADM

## 2020-10-12 RX ORDER — DEXTROSE MONOHYDRATE 25 G/50ML
12.5 INJECTION, SOLUTION INTRAVENOUS PRN
Status: DISCONTINUED | OUTPATIENT
Start: 2020-10-12 | End: 2020-10-14 | Stop reason: HOSPADM

## 2020-10-12 RX ORDER — AMINOPHYLLINE DIHYDRATE 25 MG/ML
100 INJECTION, SOLUTION INTRAVENOUS ONCE
Status: COMPLETED | OUTPATIENT
Start: 2020-10-12 | End: 2020-10-12

## 2020-10-12 RX ORDER — DEXTROSE MONOHYDRATE 50 MG/ML
100 INJECTION, SOLUTION INTRAVENOUS PRN
Status: DISCONTINUED | OUTPATIENT
Start: 2020-10-12 | End: 2020-10-14 | Stop reason: HOSPADM

## 2020-10-12 RX ORDER — NICOTINE POLACRILEX 4 MG
15 LOZENGE BUCCAL PRN
Status: DISCONTINUED | OUTPATIENT
Start: 2020-10-12 | End: 2020-10-14 | Stop reason: HOSPADM

## 2020-10-12 RX ORDER — INSULIN LISPRO 100 [IU]/ML
0-6 INJECTION, SOLUTION INTRAVENOUS; SUBCUTANEOUS
Status: DISCONTINUED | OUTPATIENT
Start: 2020-10-12 | End: 2020-10-14 | Stop reason: HOSPADM

## 2020-10-12 RX ADMIN — AMLODIPINE BESYLATE 10 MG: 5 TABLET ORAL at 09:45

## 2020-10-12 RX ADMIN — Medication 10 ML: at 21:18

## 2020-10-12 RX ADMIN — ACETAMINOPHEN 650 MG: 325 TABLET ORAL at 12:10

## 2020-10-12 RX ADMIN — TETROFOSMIN 10 MILLICURIE: 1.38 INJECTION, POWDER, LYOPHILIZED, FOR SOLUTION INTRAVENOUS at 13:09

## 2020-10-12 RX ADMIN — ACETAMINOPHEN 650 MG: 325 TABLET ORAL at 19:18

## 2020-10-12 RX ADMIN — PANTOPRAZOLE SODIUM 40 MG: 40 TABLET, DELAYED RELEASE ORAL at 05:07

## 2020-10-12 RX ADMIN — INSULIN LISPRO 1 UNITS: 100 INJECTION, SOLUTION INTRAVENOUS; SUBCUTANEOUS at 21:18

## 2020-10-12 RX ADMIN — ENOXAPARIN SODIUM 40 MG: 40 INJECTION SUBCUTANEOUS at 09:45

## 2020-10-12 RX ADMIN — ONDANSETRON 4 MG: 2 INJECTION INTRAMUSCULAR; INTRAVENOUS at 15:57

## 2020-10-12 RX ADMIN — Medication 10 ML: at 09:46

## 2020-10-12 RX ADMIN — AMINOPHYLLINE 100 MG: 25 INJECTION, SOLUTION INTRAVENOUS at 15:57

## 2020-10-12 RX ADMIN — LISINOPRIL 40 MG: 20 TABLET ORAL at 09:45

## 2020-10-12 RX ADMIN — TETROFOSMIN 30 MILLICURIE: 1.38 INJECTION, POWDER, LYOPHILIZED, FOR SOLUTION INTRAVENOUS at 14:05

## 2020-10-12 RX ADMIN — HYDROCHLOROTHIAZIDE 25 MG: 25 TABLET ORAL at 09:45

## 2020-10-12 ASSESSMENT — PAIN SCALES - GENERAL
PAINLEVEL_OUTOF10: 6
PAINLEVEL_OUTOF10: 3
PAINLEVEL_OUTOF10: 5
PAINLEVEL_OUTOF10: 0

## 2020-10-12 ASSESSMENT — PAIN DESCRIPTION - DESCRIPTORS
DESCRIPTORS: HEADACHE
DESCRIPTORS: HEADACHE

## 2020-10-12 ASSESSMENT — PAIN DESCRIPTION - PAIN TYPE
TYPE: ACUTE PAIN
TYPE: ACUTE PAIN

## 2020-10-12 ASSESSMENT — PAIN DESCRIPTION - LOCATION: LOCATION: HEAD

## 2020-10-12 NOTE — PROGRESS NOTES
[facility] PROGRESS NOTE    10/12/2020 8:56 AM        Name: Hilda Jefferson . Admitted: 10/11/2020  Primary Care Provider: No primary care provider on file. (Tel: None)    Brief Course:  Hilda Jefferson is a 38 y.o. female  with PMHx  HTN , dyslipidemia, GERD, recent COvID diagnosis presents wih c/o chest pain  Dyspnea, and dizziness. She was given ASA and nitro that relieved the sx. Chest xray is neg for PNA. Troponin is < 0.01. EKG is non acute.  No recent stress test      CC: Dyspnea and dizziness    Subjective: Patient seen and examined  Overnight event noted  Patient continued to complain of the chest pain which is reproduced by breathing and on pressure to the chest but denied any palpitation, dizziness, shortness of breath, cough nausea, vomiting or abdominal pain      Reviewed interval ancillary notes    Current Medications  hydroCHLOROthiazide (HYDRODIURIL) tablet 25 mg, Daily  pantoprazole (PROTONIX) tablet 40 mg, Daily  traMADol (ULTRAM) tablet 50 mg, Q4H PRN  sodium chloride flush 0.9 % injection 10 mL, 2 times per day  sodium chloride flush 0.9 % injection 10 mL, PRN  acetaminophen (TYLENOL) tablet 650 mg, Q6H PRN    Or  acetaminophen (TYLENOL) suppository 650 mg, Q6H PRN  polyethylene glycol (GLYCOLAX) packet 17 g, Daily PRN  promethazine (PHENERGAN) tablet 12.5 mg, Q6H PRN    Or  ondansetron (ZOFRAN) injection 4 mg, Q6H PRN  enoxaparin (LOVENOX) injection 40 mg, Daily  amLODIPine (NORVASC) tablet 10 mg, Daily    And  lisinopril (PRINIVIL;ZESTRIL) tablet 40 mg, Daily        Objective:  /71   Pulse 90   Temp 97.4 °F (36.3 °C) (Temporal)   Resp 16   Ht 5' 1\" (1.549 m)   Wt 140 lb 14 oz (63.9 kg)   SpO2 100%   BMI 26.62 kg/m²     Intake/Output Summary (Last 24 hours) at 10/12/2020 0856  Last data filed at 10/12/2020 0504  Gross per 24 hour   Intake --   Output 325 ml   Net -325 ml      Wt Readings from Last 3 Encounters:   10/12/20 140 lb 14 oz (63.9 kg)   02/05/20 135 lb (61.2 kg)       General appearance:  Appears comfortable  Eyes: Sclera clear. Pupils equal.  ENT: Moist oral mucosa. Trachea midline, no adenopathy. Cardiovascular: Regular rhythm, normal S1, S2. No murmur. No edema in lower extremities  Respiratory: Not using accessory muscles. Good inspiratory effort. Clear to auscultation bilaterally, no wheeze or crackles. GI: Abdomen soft, no tenderness, not distended, normal bowel sounds  Musculoskeletal: No cyanosis in digits, neck supple  Neurology: CN 2-12 grossly intact. No speech or motor deficits  Psych: Normal affect. Alert and oriented in time, place and person  Skin: Warm, dry, normal turgor  Extremity exam shows brisk capillary refill. Peripheral pulses are palpable in lower extremities     Labs and Tests:  CBC:   Recent Labs     10/11/20  1708   WBC 12.6*   HGB 14.1        BMP:    Recent Labs     10/11/20  1708   *   K 3.7      CO2 18*   BUN 6*   CREATININE 0.6   GLUCOSE 172*     Hepatic:   Recent Labs     10/11/20  1708   AST 54*   ALT 52*   BILITOT 0.9   ALKPHOS 91     CT CHEST PULMONARY EMBOLISM W CONTRAST   Final Result   Unremarkable appearance of the chest with no evidence of pulmonary embolism   clear lungs. Partially visualized likely hepatomegaly with severe fatty   infiltration. CT HEAD WO CONTRAST   Final Result   Negative noncontrast CT examination of the brain.          XR CHEST PORTABLE   Final Result   Negative portable chest.         NM MYOCARDIAL SPECT REST EXERCISE OR RX    (Results Pending)       I viewed CXR images and EKG tracings   Discussed film/CT with reading physician      Spent 30 minutes with patient and family at bedside and on unit reviewing medical records and labs, spent greater than 50% time counseling patient  and/or coordinating care with RN    Problem List  Active Problems:    Dizziness  Resolved Problems:    * No resolved hospital problems.  *       Assessment & Plan:    Dyspnea and chest pain:   Continue to complain of on chest pain reproducible by breathing and pressure  CXR negative for any acute pathology  CTPA negative for any acute pulmonary embolism  Troponin remained negative  Cardiology on board; plan for stress test today     Hypertension: Continue amlodipine, lisinopril and hydrochlorothiazide    Dizziness; resolved  CT head negative for any acute intracranial pathology  Orthostatic vitals ordered        Diet: Diet NPO, After Midnight  Code:Full Code  DVT PPX   Lovenox  Disposition       Tiffany Garay MD   10/12/2020 8:56 AM

## 2020-10-12 NOTE — PROGRESS NOTES
Pt admitted to [address]. Attached to monitor, bedside table in reach.  All question answered

## 2020-10-12 NOTE — PROGRESS NOTES
Instructed on Lexiscan Stress Test Procedure including possible side effects/ adverse reactions. Patient verbalizes  understanding and denies having any questions . See [facility]             Aminophylline given per lexiscan protocol in stress lab for c/o persistant nausea and chest tightness.

## 2020-10-12 NOTE — CARE COORDINATION
Patient admitted as Observation/OPIB with an anticipated short hospitalization length of stay. Chart reviewed and it appears that patient has minimal needs for discharge at this time. Discussed with patients nurse and requested that case management be notified if discharge needs are identified. Case management will continue to follow progress and update discharge plan as needed.     Pam Ceja, BSN, RN  [phone]

## 2020-10-12 NOTE — H&P
HOSPITALISTS HISTORY AND PHYSICAL    [id number]    Patient Information:  Katia Pratt is a 38 y.o. female [id number]  PCP:  No primary care provider on file. (Tel: None )    Chief complaint:    Chief Complaint   Patient presents with    Dizziness     Pt brought in per [facility] for multiple complaints pt did have Covid in August but over the last 2 weeks she has not felt well, dizziness, sob, fatigue, burning sensation. History of Present Illness:  Belia Rey is a 38 y.o. female   With h/o HTN , dyslipidemia, GERD, recent COvID diagnosis presents wih c/o chest pain  Dyspnea, and dizziness. She was given ASA and nitro that relieved the sx  Chest xray is neg for PNA. Troponin is < 0.01  EKG is non acute. No recent stress test    REVIEW OF SYSTEMS:   Constitutional: Negative for fever,chills or night sweats  ENT: Negative for rhinorrhea, epistaxis, hoarseness, sore throat. Respiratory: +ve for shortness of breath,wheezing  Cardiovascular: +ve for chest pain, palpitations   Gastrointestinal: Negative for nausea, vomiting, diarrhea  Genitourinary: Negative for polyuria, dysuria   Hematologic/Lymphatic: Negative for bleeding tendency, easy bruising  Musculoskeletal: Negative for myalgias and arthralgias  Neurologic: Negative for confusion,dysarthria. Skin: Negative for itching,rash  Psychiatric: Negative for depression,anxiety, agitation. Endocrine: Negative for polydipsia,polyuria,heat /cold intolerance. Past Medical History:   has a past medical history of GERD (gastroesophageal reflux disease), Hyperlipidemia, and Hypertension. Past Surgical History:   has no past surgical history on file. Medications:  No current facility-administered medications on file prior to encounter.       Current Outpatient Medications on File Prior to Encounter   Medication Sig Dispense Refill    amitriptyline (ELAVIL) 10 MG tablet Take 10 mg by mouth nightly      melatonin 3 MG TABS tablet Take 3 mg by mouth nightly as needed      topiramate (TOPAMAX) 100 MG tablet Take 100 mg by mouth 2 times daily      amLODIPine (NORVASC) 10 MG tablet Take 10 mg by mouth daily      loratadine (CLARITIN) 10 MG capsule Take 10 mg by mouth daily      metFORMIN (GLUCOPHAGE) 500 MG tablet Take 500 mg by mouth 2 times daily (with meals)      hydrochlorothiazide (HYDRODIURIL) 25 MG tablet Take 25 mg by mouth daily      amLODIPine-benazepril (LOTREL) 10-40 MG per capsule daily      fluticasone (FLONASE) 50 MCG/ACT nasal spray 2 sprays by Nasal route daily 1 Bottle 0    omeprazole (PRILOSEC) 20 MG capsule Take 20 mg by mouth daily      pantoprazole (PROTONIX) 20 MG tablet Take 2 tablets by mouth daily 30 tablet 0    traMADol (ULTRAM) 50 MG tablet Take 1 tablet by mouth every 6 hours as needed for Pain 15 tablet 0    ondansetron (ZOFRAN) 4 MG tablet Take 1 tablet by mouth every 8 hours as needed for Nausea 20 tablet 0     Current Facility-Administered Medications   Medication Dose Route Frequency Provider Last Rate Last Dose    hydroCHLOROthiazide (HYDRODIURIL) tablet 25 mg  25 mg Oral Daily Melia Noel MD        pantoprazole (PROTONIX) tablet 40 mg  40 mg Oral Daily Melia Noel MD   40 mg at 10/12/20 0507    traMADol (ULTRAM) tablet 50 mg  50 mg Oral Q4H PRN Brent Magaña MD   50 mg at 10/11/20 2232    sodium chloride flush 0.9 % injection 10 mL  10 mL Intravenous 2 times per day Brent Magaña MD   10 mL at 10/11/20 2146    sodium chloride flush 0.9 % injection 10 mL  10 mL Intravenous PRN Brent Magaña MD        acetaminophen (TYLENOL) tablet 650 mg  650 mg Oral Q6H PRN Brent Magaña MD        Or   Blase Liming acetaminophen (TYLENOL) suppository 650 mg  650 mg Rectal Q6H PRN Brent Magaña MD        polyethylene glycol (GLYCOLAX) packet 17 g  17 g Oral Daily PRN Brent Magaña MD        promethazine (PHENERGAN) tablet 12.5 mg  12.5 mg Oral Q6H PRN Brent Magaña MD Or    ondansetron (ZOFRAN) injection 4 mg  4 mg Intravenous Q6H PRN Alma Delia Vera MD        enoxaparin (LOVENOX) injection 40 mg  40 mg Subcutaneous Daily Alma Delia Vera MD        amLODIPine (NORVASC) tablet 10 mg  10 mg Oral Daily Alma Delia Vera MD        And    lisinopril (PRINIVIL;ZESTRIL) tablet 40 mg  40 mg Oral Daily Alma Delia Vera MD           Allergies: Allergies   Allergen Reactions    Asa [Aspirin] Nausea Only        Social History:   reports that she has never smoked. She has never used smokeless tobacco. She reports that she does not drink alcohol or use drugs. Family History:  family history is not on file. , *    Physical Exam:  /78   Pulse 86   Temp 97.2 °F (36.2 °C) (Temporal)   Resp 18   Ht 5' 1\" (1.549 m)   Wt 140 lb 14 oz (63.9 kg)   SpO2 100%   BMI 26.62 kg/m²     General appearance:  Appears comfortable. Well nourished  Eyes: Sclera clear, pupils equal  ENT: Moist mucus membranes, no thrush. Trachea midline. Cardiovascular: Regular rhythm, normal S1, S2. No murmur, gallop, rub. No edema in lower extremities  Respiratory: Clear to auscultation bilaterally, no wheeze, good inspiratory effort  Gastrointestinal: Abdomen soft, non-tender, not distended, normal bowel sounds  Musculoskeletal: No cyanosis in digits, neck supple  Neurology: Cranial nerves grossly intact. Alert and oriented in time, place and person. No speech or motor deficits  Psychiatry: Appropriate affect.  Not agitated  Skin: Warm, dry, normal turgor, no rash    Labs:  CBC:   Lab Results   Component Value Date    WBC 12.6 10/11/2020    RBC 4.56 10/11/2020    HGB 14.1 10/11/2020    HCT 40.9 10/11/2020    MCV 89.7 10/11/2020    MCH 30.9 10/11/2020    MCHC 34.5 10/11/2020    RDW 12.9 10/11/2020     10/11/2020    MPV 9.3 10/11/2020     BMP:    Lab Results   Component Value Date     10/11/2020    K 3.7 10/11/2020     10/11/2020    CO2 18 10/11/2020    BUN 6 10/11/2020    CREATININE 0.6 10/11/2020    CALCIUM 9.4 10/11/2020    GFRAA >60 10/11/2020    LABGLOM >60 10/11/2020    GLUCOSE 172 10/11/2020       Chest Xray:   EKG: I      Problem List  Active Problems:    Dizziness  Resolved Problems:    * No resolved hospital problems. *        Assessment/Plan:         1. Dyspnea and chest pain   Cont to monitor serial troponin  Cont ASA and nitro  Stress test ordered    Admit as obs. I anticipate hospitalization spanning less than two midnights for investigation and treatment of the above medically necessary diagnoses.       Hina Carbone MD    10/12/2020 5:44 AM

## 2020-10-13 PROBLEM — R07.9 CHEST PAIN: Status: ACTIVE | Noted: 2020-10-13

## 2020-10-13 LAB
ANION GAP SERPL CALCULATED.3IONS-SCNC: 16 MMOL/L (ref 3–16)
BUN BLDV-MCNC: 6 MG/DL (ref 7–20)
CALCIUM SERPL-MCNC: 10 MG/DL (ref 8.3–10.6)
CHLORIDE BLD-SCNC: 104 MMOL/L (ref 99–110)
CO2: 22 MMOL/L (ref 21–32)
CREAT SERPL-MCNC: 0.7 MG/DL (ref 0.6–1.1)
GFR AFRICAN AMERICAN: >60
GFR NON-AFRICAN AMERICAN: >60
GLUCOSE BLD-MCNC: 131 MG/DL (ref 70–99)
GLUCOSE BLD-MCNC: 143 MG/DL (ref 70–99)
GLUCOSE BLD-MCNC: 144 MG/DL (ref 70–99)
GLUCOSE BLD-MCNC: 189 MG/DL (ref 70–99)
HCT VFR BLD CALC: 41.6 % (ref 36–48)
HEMOGLOBIN: 14.3 G/DL (ref 12–16)
MCH RBC QN AUTO: 30.7 PG (ref 26–34)
MCHC RBC AUTO-ENTMCNC: 34.4 G/DL (ref 31–36)
MCV RBC AUTO: 89 FL (ref 80–100)
PDW BLD-RTO: 13.5 % (ref 12.4–15.4)
PERFORMED ON: ABNORMAL
PLATELET # BLD: 309 K/UL (ref 135–450)
PLATELET SLIDE REVIEW: ADEQUATE
PMV BLD AUTO: 9.7 FL (ref 5–10.5)
POTASSIUM SERPL-SCNC: 3.4 MMOL/L (ref 3.5–5.1)
RBC # BLD: 4.67 M/UL (ref 4–5.2)
SARS-COV-2, PCR: NOT DETECTED
SLIDE REVIEW: NORMAL
SODIUM BLD-SCNC: 142 MMOL/L (ref 136–145)
WBC # BLD: 10.3 K/UL (ref 4–11)

## 2020-10-13 PROCEDURE — 1200000000 HC SEMI PRIVATE

## 2020-10-13 PROCEDURE — 96376 TX/PRO/DX INJ SAME DRUG ADON: CPT

## 2020-10-13 PROCEDURE — 99254 IP/OBS CNSLTJ NEW/EST MOD 60: CPT | Performed by: INTERNAL MEDICINE

## 2020-10-13 PROCEDURE — 6370000000 HC RX 637 (ALT 250 FOR IP): Performed by: INTERNAL MEDICINE

## 2020-10-13 PROCEDURE — 6370000000 HC RX 637 (ALT 250 FOR IP): Performed by: FAMILY MEDICINE

## 2020-10-13 PROCEDURE — 80048 BASIC METABOLIC PNL TOTAL CA: CPT

## 2020-10-13 PROCEDURE — 85027 COMPLETE CBC AUTOMATED: CPT

## 2020-10-13 PROCEDURE — 6360000002 HC RX W HCPCS: Performed by: FAMILY MEDICINE

## 2020-10-13 PROCEDURE — 94760 N-INVAS EAR/PLS OXIMETRY 1: CPT

## 2020-10-13 PROCEDURE — 96372 THER/PROPH/DIAG INJ SC/IM: CPT

## 2020-10-13 PROCEDURE — 2580000003 HC RX 258: Performed by: FAMILY MEDICINE

## 2020-10-13 PROCEDURE — U0003 INFECTIOUS AGENT DETECTION BY NUCLEIC ACID (DNA OR RNA); SEVERE ACUTE RESPIRATORY SYNDROME CORONAVIRUS 2 (SARS-COV-2) (CORONAVIRUS DISEASE [COVID-19]), AMPLIFIED PROBE TECHNIQUE, MAKING USE OF HIGH THROUGHPUT TECHNOLOGIES AS DESCRIBED BY CMS-2020-01-R: HCPCS

## 2020-10-13 RX ORDER — CALCIUM CARBONATE 200(500)MG
500 TABLET,CHEWABLE ORAL 3 TIMES DAILY PRN
Status: DISCONTINUED | OUTPATIENT
Start: 2020-10-13 | End: 2020-10-14 | Stop reason: HOSPADM

## 2020-10-13 RX ORDER — MAGNESIUM HYDROXIDE/ALUMINUM HYDROXICE/SIMETHICONE 120; 1200; 1200 MG/30ML; MG/30ML; MG/30ML
30 SUSPENSION ORAL EVERY 6 HOURS PRN
Status: DISCONTINUED | OUTPATIENT
Start: 2020-10-13 | End: 2020-10-14 | Stop reason: HOSPADM

## 2020-10-13 RX ADMIN — PANTOPRAZOLE SODIUM 40 MG: 40 TABLET, DELAYED RELEASE ORAL at 00:19

## 2020-10-13 RX ADMIN — PANTOPRAZOLE SODIUM 40 MG: 40 TABLET, DELAYED RELEASE ORAL at 23:40

## 2020-10-13 RX ADMIN — INSULIN LISPRO 1 UNITS: 100 INJECTION, SOLUTION INTRAVENOUS; SUBCUTANEOUS at 16:14

## 2020-10-13 RX ADMIN — ALUMINUM HYDROXIDE, MAGNESIUM HYDROXIDE, AND SIMETHICONE 30 ML: 200; 200; 20 SUSPENSION ORAL at 16:59

## 2020-10-13 RX ADMIN — Medication 10 ML: at 09:18

## 2020-10-13 RX ADMIN — ACETAMINOPHEN 650 MG: 325 TABLET ORAL at 19:56

## 2020-10-13 RX ADMIN — ACETAMINOPHEN 650 MG: 325 TABLET ORAL at 10:43

## 2020-10-13 RX ADMIN — ENOXAPARIN SODIUM 40 MG: 40 INJECTION SUBCUTANEOUS at 09:19

## 2020-10-13 RX ADMIN — INSULIN LISPRO 1 UNITS: 100 INJECTION, SOLUTION INTRAVENOUS; SUBCUTANEOUS at 11:45

## 2020-10-13 RX ADMIN — INSULIN LISPRO 1 UNITS: 100 INJECTION, SOLUTION INTRAVENOUS; SUBCUTANEOUS at 09:26

## 2020-10-13 RX ADMIN — Medication 10 ML: at 21:33

## 2020-10-13 RX ADMIN — ONDANSETRON 4 MG: 2 INJECTION INTRAMUSCULAR; INTRAVENOUS at 09:18

## 2020-10-13 ASSESSMENT — PAIN SCALES - GENERAL
PAINLEVEL_OUTOF10: 0
PAINLEVEL_OUTOF10: 6
PAINLEVEL_OUTOF10: 0
PAINLEVEL_OUTOF10: 3
PAINLEVEL_OUTOF10: 5
PAINLEVEL_OUTOF10: 3

## 2020-10-13 ASSESSMENT — PAIN DESCRIPTION - PAIN TYPE
TYPE: CHRONIC PAIN
TYPE: CHRONIC PAIN

## 2020-10-13 ASSESSMENT — PAIN DESCRIPTION - DESCRIPTORS
DESCRIPTORS: HEADACHE
DESCRIPTORS: HEADACHE

## 2020-10-13 ASSESSMENT — PAIN DESCRIPTION - LOCATION
LOCATION: HEAD
LOCATION: HEAD

## 2020-10-13 NOTE — PLAN OF CARE
Problem: Falls - Risk of:  Goal: Will remain free from falls  Description: Will remain free from falls  Outcome: Ongoing   Patient remains free of falls at this time. Patient instructed to call for assistance with ambulation and toileting due to reported dizziness during these activities. Patient verbalized understanding. Will continue to monitor. Problem: Falls - Risk of:  Goal: Absence of physical injury  Description: Absence of physical injury  Outcome: Ongoing   Patient remains free of injury at this time. Room kept uncluttered with call light, bedside table and personal items within easy reach. Hourly rounding for daily care needs. Will continue to monitor. Problem: Pain:  Goal: Pain level will decrease  Description: Pain level will decrease  Outcome: Ongoing   Patient received Tylenol for a moderate headache with some relief noted. Patient just complained of epigastric pain a few minutes ago. Will continue to monitor. Problem: Pain:  Goal: Control of acute pain  Description: Control of acute pain  Outcome: Ongoing    Patient received Tylenol for a moderate headache with some relief noted. Problem: Pain:  Goal: Control of chronic pain  Description: Control of chronic pain  Outcome: Ongoing   Patient c/o some chronic epigastric pain just a few minutes ago. Will continue to monitor.

## 2020-10-13 NOTE — PROGRESS NOTES
This morning patient reported that her reflux and epigastric pain were gone. Still getting dizzy when getting up with soft BP noted. Will continue to monitor.

## 2020-10-13 NOTE — PROGRESS NOTES
Patient still complaining of dizziness with ambulation. HR noted to jump up into the 120's with activity but with rest it rapidly returns to the 90's. Patient also complained of some epigastric pain. Denies nausea at this time. Will continue to monitor.

## 2020-10-13 NOTE — PLAN OF CARE
Problem: Falls - Risk of:  Goal: Will remain free from falls  Description: Will remain free from falls  10/13/2020 [id number] by Vitaliy Prabhakar RN  Outcome: Ongoing  10/13/2020 0538 by Adonay Escalante RN  Outcome: Ongoing  10/12/2020 2139 by Adonay Escalante RN  Outcome: Ongoing  Goal: Absence of physical injury  Description: Absence of physical injury  10/13/2020 0712 by Vitaliy Prabhakar RN  Outcome: Ongoing  10/13/2020 0538 by Adonay Escalante RN  Outcome: Ongoing  10/12/2020 2139 by Adonay Escalante RN  Outcome: Ongoing     Problem: Pain:  Goal: Pain level will decrease  Description: Pain level will decrease  10/13/2020 0712 by Vitaliy Prabhakar RN  Outcome: Ongoing  10/13/2020 0538 by Adonay Escalante RN  Outcome: Ongoing  10/12/2020 2139 by Adonay Escalante RN  Outcome: Ongoing  Goal: Control of acute pain  Description: Control of acute pain  10/13/2020 0712 by Vitaliy Prabhakar RN  Outcome: Ongoing  10/13/2020 0538 by Adonay Escalante RN  Outcome: Ongoing  10/12/2020 2139 by Adonay Escalante RN  Outcome: Ongoing  Goal: Control of chronic pain  Description: Control of chronic pain  10/13/2020 0712 by Vitaliy Prabhakar RN  Outcome: Ongoing  10/13/2020 0538 by Adonay Escalnate RN  Outcome: Ongoing  10/12/2020 2139 by Adonay Escalante RN  Outcome: Ongoing

## 2020-10-13 NOTE — CONSULTS
Radha 80   H&P  [phone]      Chief Complaint   Patient presents with    Dizziness     Pt brought in per [facility] for multiple complaints pt did have Covid in August but over the last 2 weeks she has not felt well, dizziness, sob, fatigue, burning sensation. History of Present Illness:  Ariana Cook is a 38 y.o. patient who presented to the hospital with complaints of dyspnea, dysphagia, fatigue/malasie and myalgias. She has pain in her chest with swallowing as well. She was admitted for CP. She had nuclear stress test which was normal. I have been asked to provide consultation regarding further management and testing. Past Medical History:   has a past medical history of GERD (gastroesophageal reflux disease), Hyperlipidemia, and Hypertension. Surgical History:   has no past surgical history on file. Social History:   reports that she has never smoked. She has never used smokeless tobacco. She reports that she does not drink alcohol or use drugs. Family History:  family history is not on file. Home Medications:  Were reviewed and are listed in nursing record. and/or listed below  Prior to Admission medications    Medication Sig Start Date End Date Taking?  Authorizing Provider   amitriptyline (ELAVIL) 10 MG tablet Take 10 mg by mouth nightly   Yes Historical Provider, MD   melatonin 3 MG TABS tablet Take 3 mg by mouth nightly as needed   Yes Historical Provider, MD   topiramate (TOPAMAX) 100 MG tablet Take 100 mg by mouth 2 times daily   Yes Historical Provider, MD   amLODIPine (NORVASC) 10 MG tablet Take 10 mg by mouth daily   Yes Historical Provider, MD   loratadine (CLARITIN) 10 MG capsule Take 10 mg by mouth daily   Yes Historical Provider, MD   metFORMIN (GLUCOPHAGE) 500 MG tablet Take 500 mg by mouth 2 times daily (with meals)   Yes Historical Provider, MD   hydrochlorothiazide (HYDRODIURIL) 25 MG tablet Take 25 mg by mouth daily 1/14/20  Yes Historical Provider, MD   amLODIPine-benazepril (LOTREL) 10-40 MG per capsule daily 1/15/20  Yes Historical Provider, MD   fluticasone (FLONASE) 50 MCG/ACT nasal spray 2 sprays by Nasal route daily 2/5/20   Emir Murray DO   omeprazole (PRILOSEC) 20 MG capsule Take 20 mg by mouth daily    Historical Provider, MD   pantoprazole (PROTONIX) 20 MG tablet Take 2 tablets by mouth daily 10/11/15   Faye Mistry MD   traMADol Fe Olivarez) 50 MG tablet Take 1 tablet by mouth every 6 hours as needed for Pain 10/11/15   Faye Mistry MD   ondansetron [facility]) 4 MG tablet Take 1 tablet by mouth every 8 hours as needed for Nausea 10/11/15   Faye Mistry MD        Current Medications:  Current Facility-Administered Medications   Medication Dose Route Frequency Provider Last Rate Last Dose    glucose (GLUTOSE) 40 % oral gel 15 g  15 g Oral PRN Brittni Samson MD        dextrose 50 % IV solution  12.5 g Intravenous PRN Brittni Samson MD        glucagon (rDNA) injection 1 mg  1 mg Intramuscular PRN Brittni Samson MD        dextrose 5 % solution  100 mL/hr Intravenous PRN Brittni Samson MD        insulin lispro (1 Unit Dial) 0-6 Units  0-6 Units Subcutaneous TID WC Brittni Samson MD        insulin lispro (1 Unit Dial) 0-3 Units  0-3 Units Subcutaneous Nightly Brittni Samson MD   1 Units at 10/12/20 2118    regadenoson (LEXISCAN) injection 0.4 mg  0.4 mg Intravenous ONCE PRN Lajuan Favre, MD        hydroCHLOROthiazide (HYDRODIURIL) tablet 25 mg  25 mg Oral Daily Melia Noel MD   25 mg at 10/12/20 0945    pantoprazole (PROTONIX) tablet 40 mg  40 mg Oral Daily Alma Delia Vera MD   40 mg at 10/13/20 0019    traMADol (ULTRAM) tablet 50 mg  50 mg Oral Q4H PRN Alma Delia Vera MD   50 mg at 10/11/20 2232    sodium chloride flush 0.9 % injection 10 mL  10 mL Intravenous 2 times per day Alma Delia Vera MD   10 mL at 10/12/20 2118    sodium chloride flush 0.9 % injection 10 mL  10 mL Intravenous PRN Alma Delia Vera MD  acetaminophen (TYLENOL) tablet 650 mg  650 mg Oral Q6H PRN Hina Carbone MD   650 mg at 10/12/20 1918    Or    acetaminophen (TYLENOL) suppository 650 mg  650 mg Rectal Q6H PRN Hina Carbone MD        polyethylene glycol (GLYCOLAX) packet 17 g  17 g Oral Daily PRN Hina Carbone MD        promethazine (PHENERGAN) tablet 12.5 mg  12.5 mg Oral Q6H PRN Hina Carbone MD        Or    ondansetron (ZOFRAN) injection 4 mg  4 mg Intravenous Q6H PRN Hina Carbone MD   4 mg at 10/12/20 1557    enoxaparin (LOVENOX) injection 40 mg  40 mg Subcutaneous Daily Melia Noel MD   40 mg at 10/12/20 0945    amLODIPine (NORVASC) tablet 10 mg  10 mg Oral Daily Hina Carbone MD   10 mg at 10/12/20 0945    And    lisinopril (PRINIVIL;ZESTRIL) tablet 40 mg  40 mg Oral Daily Hina Carbone MD   40 mg at 10/12/20 0945        Allergies:  Aiyana Whitley [aspirin]     Review of Systems:     · Constitutional: there has been no unanticipated weight loss. There's been no change in energy level, sleep pattern, or activity level. · Eyes: No visual changes or diplopia. No scleral icterus. · ENT: No Headaches, hearing loss or vertigo. No mouth sores or sore throat. · Cardiovascular: Reviewed in HPI  · Respiratory: No cough or wheezing, no sputum production. No hematemesis. · Gastrointestinal: No abdominal pain, appetite loss, blood in stools. No change in bowel or bladder habits. · Genitourinary: No dysuria, trouble voiding, or hematuria. · Musculoskeletal:  No gait disturbance, weakness or joint complaints. · Integumentary: No rash or pruritis. · Neurological: No headache, diplopia, change in muscle strength, numbness or tingling. No change in gait, balance, coordination, mood, affect, memory, mentation, behavior. · Psychiatric: No anxiety, no depression. · Endocrine: No malaise, fatigue or temperature intolerance. No excessive thirst, fluid intake, or urination. No tremor.   · Hematologic/Lymphatic: No abnormal bruising or bleeding, blood clots or swollen lymph nodes. · Allergic/Immunologic: No nasal congestion or hives.   ·     Physical Examination:    Vitals:    10/13/20 0822   BP:    Pulse: 82   Resp:    Temp:    SpO2: 98%    Weight: 136 lb 7.4 oz (61.9 kg)         General Appearance:  Alert, cooperative, no distress, appears stated age   Head:  Normocephalic, without obvious abnormality, atraumatic   Eyes:  PERRL, conjunctiva/corneas clear       Nose: Nares normal, no drainage or sinus tenderness   Throat: Lips, mucosa, and tongue normal   Neck: Supple, symmetrical, trachea midline, no adenopathy, thyroid: not enlarged, symmetric, no tenderness/mass/nodules, no carotid bruit or JVD       Lungs:   Clear to auscultation bilaterally, respirations unlabored   Chest Wall:  No tenderness or deformity   Heart:  Regular rate and rhythm, S1, S2 normal, no murmur, rub or gallop   Abdomen:   Soft, non-tender, bowel sounds active all four quadrants,  no masses, no organomegaly           Extremities: Extremities normal, atraumatic, no cyanosis or edema   Pulses: 2+ and symmetric   Skin: Skin color, texture, turgor normal, no rashes or lesions   Pysch: Normal mood and affect   Neurologic: Normal gross motor and sensory exam.         Labs  CBC:   Lab Results   Component Value Date    WBC 10.3 10/13/2020    RBC 4.67 10/13/2020    HGB 14.3 10/13/2020    HCT 41.6 10/13/2020    MCV 89.0 10/13/2020    RDW 13.5 10/13/2020     10/13/2020     CMP:    Lab Results   Component Value Date     10/13/2020    K 3.4 10/13/2020    K 3.7 10/11/2020     10/13/2020    CO2 22 10/13/2020    BUN 6 10/13/2020    CREATININE 0.7 10/13/2020    GFRAA >60 10/13/2020    AGRATIO 1.5 10/11/2020    LABGLOM >60 10/13/2020    GLUCOSE 143 10/13/2020    PROT 7.5 10/11/2020    CALCIUM 10.0 10/13/2020    BILITOT 0.9 10/11/2020    ALKPHOS 91 10/11/2020    AST 54 10/11/2020    ALT 52 10/11/2020     PT/INR:  No results found for: PTINR  Lab Results   Component Value Date testing results. All questions and concerns were addressed to the patient/family. Alternatives to my treatment were discussed. The note was completed using EMR. Every effort was made to ensure accuracy; however, inadvertent computerized transcription errors may be present.     Nancy Alarcon MD 10/13/2020 8:47 AM

## 2020-10-14 VITALS
TEMPERATURE: 97.8 F | HEIGHT: 61 IN | RESPIRATION RATE: 16 BRPM | SYSTOLIC BLOOD PRESSURE: 113 MMHG | DIASTOLIC BLOOD PRESSURE: 77 MMHG | OXYGEN SATURATION: 98 % | HEART RATE: 81 BPM | WEIGHT: 135.8 LBS | BODY MASS INDEX: 25.64 KG/M2

## 2020-10-14 LAB
ANION GAP SERPL CALCULATED.3IONS-SCNC: 15 MMOL/L (ref 3–16)
BUN BLDV-MCNC: 7 MG/DL (ref 7–20)
CALCIUM SERPL-MCNC: 9.7 MG/DL (ref 8.3–10.6)
CHLORIDE BLD-SCNC: 101 MMOL/L (ref 99–110)
CO2: 22 MMOL/L (ref 21–32)
CREAT SERPL-MCNC: 0.7 MG/DL (ref 0.6–1.1)
GFR AFRICAN AMERICAN: >60
GFR NON-AFRICAN AMERICAN: >60
GLUCOSE BLD-MCNC: 140 MG/DL (ref 70–99)
GLUCOSE BLD-MCNC: 202 MG/DL (ref 70–99)
HCT VFR BLD CALC: 41.8 % (ref 36–48)
HEMOGLOBIN: 14.7 G/DL (ref 12–16)
MCH RBC QN AUTO: 31.5 PG (ref 26–34)
MCHC RBC AUTO-ENTMCNC: 35.2 G/DL (ref 31–36)
MCV RBC AUTO: 89.3 FL (ref 80–100)
PDW BLD-RTO: 13.1 % (ref 12.4–15.4)
PERFORMED ON: ABNORMAL
PLATELET # BLD: 326 K/UL (ref 135–450)
PMV BLD AUTO: 9.4 FL (ref 5–10.5)
POTASSIUM SERPL-SCNC: 3.6 MMOL/L (ref 3.5–5.1)
RBC # BLD: 4.68 M/UL (ref 4–5.2)
SODIUM BLD-SCNC: 138 MMOL/L (ref 136–145)
WBC # BLD: 9.7 K/UL (ref 4–11)

## 2020-10-14 PROCEDURE — 6360000002 HC RX W HCPCS: Performed by: FAMILY MEDICINE

## 2020-10-14 PROCEDURE — 80048 BASIC METABOLIC PNL TOTAL CA: CPT

## 2020-10-14 PROCEDURE — 2580000003 HC RX 258: Performed by: FAMILY MEDICINE

## 2020-10-14 PROCEDURE — 6370000000 HC RX 637 (ALT 250 FOR IP): Performed by: FAMILY MEDICINE

## 2020-10-14 PROCEDURE — 85027 COMPLETE CBC AUTOMATED: CPT

## 2020-10-14 RX ADMIN — INSULIN LISPRO 1 UNITS: 100 INJECTION, SOLUTION INTRAVENOUS; SUBCUTANEOUS at 08:21

## 2020-10-14 RX ADMIN — ACETAMINOPHEN 650 MG: 325 TABLET ORAL at 09:35

## 2020-10-14 RX ADMIN — Medication 10 ML: at 08:16

## 2020-10-14 RX ADMIN — PROMETHAZINE HYDROCHLORIDE 12.5 MG: 25 TABLET ORAL at 10:33

## 2020-10-14 RX ADMIN — ENOXAPARIN SODIUM 40 MG: 40 INJECTION SUBCUTANEOUS at 08:11

## 2020-10-14 ASSESSMENT — PAIN SCALES - GENERAL
PAINLEVEL_OUTOF10: 0
PAINLEVEL_OUTOF10: 1
PAINLEVEL_OUTOF10: 0
PAINLEVEL_OUTOF10: 4
PAINLEVEL_OUTOF10: 0

## 2020-10-14 ASSESSMENT — PAIN DESCRIPTION - PROGRESSION: CLINICAL_PROGRESSION: GRADUALLY IMPROVING

## 2020-10-14 ASSESSMENT — PAIN DESCRIPTION - DESCRIPTORS: DESCRIPTORS: HEADACHE

## 2020-10-14 ASSESSMENT — PAIN DESCRIPTION - PAIN TYPE: TYPE: ACUTE PAIN

## 2020-10-14 ASSESSMENT — PAIN DESCRIPTION - LOCATION: LOCATION: HEAD

## 2020-10-14 NOTE — PLAN OF CARE
Problem: Falls - Risk of:  Goal: Will remain free from falls  Description: Will remain free from falls  Outcome: Ongoing   Patient remains free from falls at this time. Appropriate falls precautions in place. Patient uses call light appropriately to call for assistance with transfers and ambulation. Will continue to monitor. Problem: Falls - Risk of:  Goal: Absence of physical injury  Description: Absence of physical injury  Outcome: Ongoing   Room kept uncluttered with call light, bedside table and personal items within easy reach. Hourly rounding for daily care needs. Will continue to monitor. Problem: Pain:  Goal: Pain level will decrease  Description: Pain level will decrease  Outcome: Ongoing   Patient had a headache at the early part of the shift which resolved by 2300. No other complaints of pain voiced. Will continue to monitor. Problem: Pain:  Goal: Control of acute pain  Description: Control of acute pain  Outcome: Ongoing   Patient had a headache at the early part of the shift which resolved by 2300. No other complaints of pain voiced. Will continue to monitor. Problem: Pain:  Goal: Control of chronic pain  Description: Control of chronic pain  Outcome: Ongoing   Patient had a headache at the early part of the shift which resolved by 2300. No other complaints of pain voiced. Will continue to monitor.

## 2020-10-14 NOTE — PROGRESS NOTES
[facility] PROGRESS NOTE    10/13/2020 10:13 PM        Name: Konrad Osborne . Admitted: 10/11/2020  Primary Care Provider: No primary care provider on file. (Tel: None)    Brief Course:  Konrad Osborne is a 38 y.o. female  with PMHx  HTN , dyslipidemia, GERD, recent COvID diagnosis presents wih c/o chest pain  Dyspnea, and dizziness. She was given ASA and nitro that relieved the sx. Chest xray is neg for PNA. Troponin is < 0.01. EKG is non acute.  No recent stress test      CC: Dyspnea and dizziness    Subjective: Patient seen and examined today  No acute events noted overnight  Endorsed dizziness but denied any palpitation, chest pain shortness of breath, cough nausea, vomiting or abdominal pain  COVID test ordered        Reviewed interval ancillary notes    Current Medications  calcium carbonate (TUMS) chewable tablet 500 mg, TID PRN  aluminum & magnesium hydroxide-simethicone (MAALOX) 200-200-20 MG/5ML suspension 30 mL, Q6H PRN  glucose (GLUTOSE) 40 % oral gel 15 g, PRN  dextrose 50 % IV solution, PRN  glucagon (rDNA) injection 1 mg, PRN  dextrose 5 % solution, PRN  insulin lispro (1 Unit Dial) 0-6 Units, TID WC  insulin lispro (1 Unit Dial) 0-3 Units, Nightly  regadenoson (LEXISCAN) injection 0.4 mg, ONCE PRN  pantoprazole (PROTONIX) tablet 40 mg, Daily  traMADol (ULTRAM) tablet 50 mg, Q4H PRN  sodium chloride flush 0.9 % injection 10 mL, 2 times per day  sodium chloride flush 0.9 % injection 10 mL, PRN  acetaminophen (TYLENOL) tablet 650 mg, Q6H PRN    Or  acetaminophen (TYLENOL) suppository 650 mg, Q6H PRN  polyethylene glycol (GLYCOLAX) packet 17 g, Daily PRN  promethazine (PHENERGAN) tablet 12.5 mg, Q6H PRN    Or  ondansetron (ZOFRAN) injection 4 mg, Q6H PRN  enoxaparin (LOVENOX) injection 40 mg, Daily  lisinopril (PRINIVIL;ZESTRIL) tablet 40 mg, Daily        Objective:  /71   Pulse 92   Temp 97.4 °F XR CHEST PORTABLE   Final Result   Negative portable chest.             I viewed CXR images and EKG tracings   Discussed film/CT with reading physician      Spent 30 minutes with patient and family at bedside and on unit reviewing medical records and labs, spent greater than 50% time counseling patient  and/or coordinating care with RN    Problem List  Active Problems:    Dizziness    Chest pain  Resolved Problems:    * No resolved hospital problems.  *       Assessment & Plan:    Dyspnea and chest pain: Resolved  CXR negative for any acute pathology  CTPA negative for any acute pulmonary embolism  Troponin remained negative  Cardiology on board; normal nuclear perfusion scan    Hypertension: Soft blood pressures throughout the day continue amlodipine, and hydrochlorothiazide discontinued  Continue lisinopril  Monitor blood pressure closely    Dizziness: Unclear cause  CT head negative for any acute intracranial pathology  Orthostatic vitals negative  Continued amlodipine and hydrochlorothiazide for soft pressures  PT ordered      Diet: DIET CARDIAC;  Code:Full Code  DVT PPX   Lovenox  Disposition Home tomorrow      Tomas Mondragon MD   10/13/2020 10:13 PM

## 2020-10-14 NOTE — PROGRESS NOTES
Discharge instructions reviewed with patient and family member. Patient and family verbalized understanding. All home medications have been reviewed, questions answered and patient voiced understanding. All medication side effects reviewed and patient and family verbalized understanding. Patient given prescriptions, discharge instructions, and appointment times. Patient discharged to home with family via private car. Taken to lobby via wheelchair.         Electronically signed by Vicky Crow RN on 10/14/2020 at 2:30 PM

## 2020-10-14 NOTE — PROGRESS NOTES
Patient was complaining of reflux again. Gave Protonix early and cancelled 0700 dose. No other complaints voiced. VSS. Will continue to monitor.

## 2020-10-15 LAB
BLOOD CULTURE, ROUTINE: NORMAL
CULTURE, BLOOD 2: NORMAL

## 2020-10-15 NOTE — DISCHARGE SUMMARY
range of motion without deformity. Skin: Skin color, texture, turgor normal.  No rashes or lesions. Neurologic:  Neurovascularly intact without any focal sensory/motor deficits. Cranial nerves: II-XII intact, grossly non-focal.  Psychiatric: Alert and oriented, thought content appropriate, normal insight    Consults:     IP CONSULT TO CARDIOLOGY    Disposition:  Home  Condition on discharge: Stable    Discharge Instructions/Follow-up:    Please see your PCP upon discharge and have BP rechecked and add 1 BP medication if needed as BP has been on the lower side during the hospital stay. Labs:  For convenience and continuity at follow-up the following most recent labs are provided:      CBC:    Lab Results   Component Value Date    WBC 9.7 10/14/2020    HGB 14.7 10/14/2020    HCT 41.8 10/14/2020     10/14/2020       Renal:    Lab Results   Component Value Date     10/14/2020    K 3.6 10/14/2020    K 3.7 10/11/2020     10/14/2020    CO2 22 10/14/2020    BUN 7 10/14/2020    CREATININE 0.7 10/14/2020    CALCIUM 9.7 10/14/2020       Discharge Medications:     Discharge Medication List as of 10/14/2020  1:31 PM           Details   amitriptyline (ELAVIL) 10 MG tablet Take 10 mg by mouth nightlyHistorical Med      melatonin 3 MG TABS tablet Take 3 mg by mouth nightly as neededHistorical Med      topiramate (TOPAMAX) 100 MG tablet Take 100 mg by mouth 2 times dailyHistorical Med      loratadine (CLARITIN) 10 MG capsule Take 10 mg by mouth dailyHistorical Med      metFORMIN (GLUCOPHAGE) 500 MG tablet Take 500 mg by mouth 2 times daily (with meals)Historical Med      fluticasone (FLONASE) 50 MCG/ACT nasal spray 2 sprays by Nasal route daily, Disp-1 Bottle, R-0Print      omeprazole (PRILOSEC) 20 MG capsule Take 20 mg by mouth daily      traMADol (ULTRAM) 50 MG tablet Take 1 tablet by mouth every 6 hours as needed for Pain, Disp-15 tablet, R-0      ondansetron (ZOFRAN) 4 MG tablet Take 1 tablet by mouth every 8 hours as needed for Nausea, Disp-20 tablet, R-0             Time Spent on discharge is more than 30 minutes in the examination, evaluation, counseling and review of medications and discharge plan. Signed:    Mihaela Kemp MD   10/14/2020      Thank you No primary care provider on file. for the opportunity to be involved in this patient's care. If you have any questions or concerns please feel free to contact me at [phone].

## 2020-11-18 ENCOUNTER — HOSPITAL ENCOUNTER (EMERGENCY)
Age: 45
Discharge: HOME | End: 2020-11-19
Attending: EMERGENCY MEDICINE
Payer: COMMERCIAL

## 2020-11-18 ENCOUNTER — APPOINTMENT (OUTPATIENT)
Dept: GENERAL RADIOLOGY | Age: 45
End: 2020-11-18
Payer: COMMERCIAL

## 2020-11-18 LAB
A/G RATIO: 1.5 (ref 1.1–2.2)
ALBUMIN SERPL-MCNC: 4.5 G/DL (ref 3.4–5)
ALP BLD-CCNC: 92 U/L (ref 40–129)
ALT SERPL-CCNC: 37 U/L (ref 10–40)
ANION GAP SERPL CALCULATED.3IONS-SCNC: 11 MMOL/L (ref 3–16)
AST SERPL-CCNC: 35 U/L (ref 15–37)
ATYPICAL LYMPHOCYTE RELATIVE PERCENT: 12 % (ref 0–6)
BACTERIA: ABNORMAL /HPF
BASOPHILS ABSOLUTE: 0.1 K/UL (ref 0–0.2)
BASOPHILS RELATIVE PERCENT: 1 %
BILIRUB SERPL-MCNC: 0.8 MG/DL (ref 0–1)
BILIRUBIN URINE: NEGATIVE
BLOOD, URINE: ABNORMAL
BUN BLDV-MCNC: 4 MG/DL (ref 7–20)
CALCIUM SERPL-MCNC: 9.7 MG/DL (ref 8.3–10.6)
CHLORIDE BLD-SCNC: 101 MMOL/L (ref 99–110)
CLARITY: CLEAR
CO2: 26 MMOL/L (ref 21–32)
COLOR: YELLOW
CREAT SERPL-MCNC: 0.6 MG/DL (ref 0.6–1.1)
EOSINOPHILS ABSOLUTE: 0.1 K/UL (ref 0–0.6)
EOSINOPHILS RELATIVE PERCENT: 1 %
EPITHELIAL CELLS, UA: 2 /HPF (ref 0–5)
GFR AFRICAN AMERICAN: >60
GFR NON-AFRICAN AMERICAN: >60
GLOBULIN: 3.1 G/DL
GLUCOSE BLD-MCNC: 111 MG/DL (ref 70–99)
GLUCOSE URINE: NEGATIVE MG/DL
HCT VFR BLD CALC: 43.1 % (ref 36–48)
HEMATOLOGY PATH CONSULT: YES
HEMOGLOBIN: 14.6 G/DL (ref 12–16)
HYALINE CASTS: 3 /LPF (ref 0–8)
KETONES, URINE: NEGATIVE MG/DL
LEUKOCYTE ESTERASE, URINE: ABNORMAL
LIPASE: 31 U/L (ref 13–60)
LYMPHOCYTES ABSOLUTE: 3.2 K/UL (ref 1–5.1)
LYMPHOCYTES RELATIVE PERCENT: 14 %
MCH RBC QN AUTO: 30.7 PG (ref 26–34)
MCHC RBC AUTO-ENTMCNC: 34 G/DL (ref 31–36)
MCV RBC AUTO: 90.3 FL (ref 80–100)
MICROSCOPIC EXAMINATION: YES
MONOCYTES ABSOLUTE: 0.2 K/UL (ref 0–1.3)
MONOCYTES RELATIVE PERCENT: 2 %
MYELOCYTE PERCENT: 2 %
NEUTROPHILS ABSOLUTE: 8.7 K/UL (ref 1.7–7.7)
NEUTROPHILS RELATIVE PERCENT: 68 %
NITRITE, URINE: NEGATIVE
PDW BLD-RTO: 13.5 % (ref 12.4–15.4)
PH UA: 7 (ref 5–8)
PLATELET # BLD: 324 K/UL (ref 135–450)
PMV BLD AUTO: 9.1 FL (ref 5–10.5)
POTASSIUM SERPL-SCNC: 4.3 MMOL/L (ref 3.5–5.1)
PROTEIN UA: NEGATIVE MG/DL
RBC # BLD: 4.77 M/UL (ref 4–5.2)
RBC # BLD: NORMAL 10*6/UL
RBC UA: 1 /HPF (ref 0–4)
SODIUM BLD-SCNC: 138 MMOL/L (ref 136–145)
SPECIFIC GRAVITY UA: <1.005 (ref 1–1.03)
TOTAL PROTEIN: 7.6 G/DL (ref 6.4–8.2)
TROPONIN: <0.01 NG/ML
URINE REFLEX TO CULTURE: ABNORMAL
URINE TYPE: ABNORMAL
UROBILINOGEN, URINE: 0.2 E.U./DL
WBC # BLD: 12.4 K/UL (ref 4–11)
WBC UA: 3 /HPF (ref 0–5)

## 2020-11-18 PROCEDURE — 71045 X-RAY EXAM CHEST 1 VIEW: CPT

## 2020-11-18 PROCEDURE — 80053 COMPREHEN METABOLIC PANEL: CPT

## 2020-11-18 PROCEDURE — 93005 ELECTROCARDIOGRAM TRACING: CPT | Performed by: EMERGENCY MEDICINE

## 2020-11-18 PROCEDURE — 81001 URINALYSIS AUTO W/SCOPE: CPT

## 2020-11-18 PROCEDURE — 84484 ASSAY OF TROPONIN QUANT: CPT

## 2020-11-18 PROCEDURE — 85025 COMPLETE CBC W/AUTO DIFF WBC: CPT

## 2020-11-18 PROCEDURE — 83690 ASSAY OF LIPASE: CPT

## 2020-11-18 ASSESSMENT — PAIN SCALES - GENERAL: PAINLEVEL_OUTOF10: 8

## 2020-11-19 VITALS
DIASTOLIC BLOOD PRESSURE: 106 MMHG | WEIGHT: 135 LBS | TEMPERATURE: 98 F | HEIGHT: 61 IN | RESPIRATION RATE: 21 BRPM | OXYGEN SATURATION: 98 % | BODY MASS INDEX: 25.49 KG/M2 | SYSTOLIC BLOOD PRESSURE: 150 MMHG | HEART RATE: 87 BPM

## 2020-11-19 LAB
EKG ATRIAL RATE: 94 BPM
EKG DIAGNOSIS: NORMAL
EKG P AXIS: 29 DEGREES
EKG P-R INTERVAL: 134 MS
EKG Q-T INTERVAL: 386 MS
EKG QRS DURATION: 76 MS
EKG QTC CALCULATION (BAZETT): 482 MS
EKG R AXIS: -1 DEGREES
EKG T AXIS: 2 DEGREES
EKG VENTRICULAR RATE: 94 BPM
HEMATOLOGY PATH CONSULT: NORMAL

## 2020-11-19 PROCEDURE — 99283 EMERGENCY DEPT VISIT LOW MDM: CPT

## 2020-11-19 PROCEDURE — 93010 ELECTROCARDIOGRAM REPORT: CPT | Performed by: INTERNAL MEDICINE

## 2020-11-19 RX ORDER — HYDROCHLOROTHIAZIDE 25 MG/1
25 TABLET ORAL DAILY
Qty: 30 TABLET | Refills: 0 | Status: ON HOLD | OUTPATIENT
Start: 2020-11-19 | End: 2021-04-26 | Stop reason: HOSPADM

## 2020-11-19 NOTE — ED PROVIDER NOTES
[facility]        Pt Name: Konrad Osobrne  MRN: [id number]  [place] 1975  Date of evaluation: 11/18/2020  Provider: Augustine Arrington MD  PCP: No primary care provider on file. This patient was seen and evaluated by the attending physician Augustine Arrington MD.      [address]       Chief Complaint   Patient presents with    Headache     headache, dizziness, back pain mostly on the flank areas, and chest pain, thats been going on for 3/4 days now, patient checks bp regularly and when she feels like this she has high blood pressure, also feels like she has something stuck in throat as well        HISTORY OF PRESENT ILLNESS   (Location/Symptom, Timing/Onset, Context/Setting, Quality, Duration, Modifying Factors, Severity)  Note limiting factors. Konrad Osborne is a 54 y.o. female presented today with chief complaint of headache, dizziness, intermittent chest pains with some associated high blood pressures at home. Was admitted here about a month ago secondary to polypharmacy leading to hypotension and dizziness. Since then she is been off all of her blood pressure medications. For the past couple of days as noted high readings and started to feel symptomatic. At present feels well. Nursing Notes were all reviewed and agreed with or any disagreements were addressed  in the HPI. REVIEW OF SYSTEMS    (2-9 systems for level 4, 10 or more for level 5)     Review of Systems    Positives and Pertinent negatives as per HPI. Except as noted abovein the ROS, all other systems were reviewed and negative. PAST MEDICAL HISTORY     Past Medical History:   Diagnosis Date    GERD (gastroesophageal reflux disease)     Hyperlipidemia     Hypertension          SURGICAL HISTORY   History reviewed. No pertinent surgical history.       CURRENTMEDICATIONS       Previous Medications    AMITRIPTYLINE (ELAVIL) 10 MG TABLET Take 10 mg by mouth nightly    FLUTICASONE (FLONASE) 50 MCG/ACT NASAL SPRAY    2 sprays by Nasal route daily    LORATADINE (CLARITIN) 10 MG CAPSULE    Take 10 mg by mouth daily    MELATONIN 3 MG TABS TABLET    Take 3 mg by mouth nightly as needed    METFORMIN (GLUCOPHAGE) 500 MG TABLET    Take 500 mg by mouth 2 times daily (with meals)    OMEPRAZOLE (PRILOSEC) 20 MG CAPSULE    Take 20 mg by mouth daily    ONDANSETRON (ZOFRAN) 4 MG TABLET    Take 1 tablet by mouth every 8 hours as needed for Nausea    TOPIRAMATE (TOPAMAX) 100 MG TABLET    Take 100 mg by mouth 2 times daily    TRAMADOL (ULTRAM) 50 MG TABLET    Take 1 tablet by mouth every 6 hours as needed for Pain         ALLERGIES     Asa [aspirin]    FAMILYHISTORY     History reviewed. No pertinent family history.        SOCIAL HISTORY       Social History     Socioeconomic History    Marital status:      Spouse name: None    Number of children: None    Years of education: None    Highest education level: None   Occupational History    None   Social Needs    Financial resource strain: None    Food insecurity     Worry: None     Inability: None    Transportation needs     Medical: None     Non-medical: None   Tobacco Use    Smoking status: Never Smoker    Smokeless tobacco: Never Used   Substance and Sexual Activity    Alcohol use: No    Drug use: No    Sexual activity: None   Lifestyle    Physical activity     Days per week: None     Minutes per session: None    Stress: None   Relationships    Social connections     Talks on phone: None     Gets together: None     Attends [employer] service: None     Active member of club or organization: None     Attends meetings of clubs or organizations: None     Relationship status: None    Intimate partner violence     Fear of current or ex partner: None     Emotionally abused: None     Physically abused: None     Forced sexual activity: None   Other Topics Concern    None   Social History Narrative      [address],  [place], [address]   Phone [phone]   MICROSCOPIC URINALYSIS - Abnormal; Notable for the following components:    Bacteria, UA RARE (*)     All other components within normal limits    Narrative:     Performed at:  [facility]  [address],  [place], [address]   Phone [phone]   TROPONIN    Narrative:     Performed at:  [facility] [address],  [place], [address]   Phone [phone]   LIPASE    Narrative:     Performed at:  [facility]  [address],  [place], [address]   Phone [phone]       All other labs were within normal range or not returned as of this dictation. EKG: All EKG's are interpreted by the Emergency Department Physician in the absence of a cardiologist.  Please see their note for interpretation of EKG. Reviewed by myself. Dated today 1951. Rate 94. Sinus rhythm. No change from prior. RADIOLOGY:   Non-plain film images such as CT, Ultrasound and MRI are read by the radiologist. Plain radiographic images are visualized andpreliminarily interpreted by the  ED Provider with the below findings:        Interpretation [facility] Radiologist below, if available at the time of this note:    XR CHEST PORTABLE   Final Result   Unremarkable. Xr Chest Portable    Result Date: 11/18/2020  EXAMINATION: ONE XRAY VIEW OF THE CHEST 11/18/2020 8:27 pm COMPARISON: 10/11/2020 HISTORY: ORDERING SYSTEM PROVIDED HISTORY: chest pain TECHNOLOGIST PROVIDED HISTORY: Reason for exam:->chest pain Reason for Exam: Headache (headache, dizziness, back pain mostly on the flank areas, and chest pain, thats been going on for 3/4 days now, patient checks bp regularly and when she feels like this she has high blood pressure, also feels like she has something stuck in throat as well ) Acuity: Unknown Type of Exam: Unknown FINDINGS: Normal heart size and pulmonary vasculature.   No focal consolidations, pleural effusions, or pneumothorax. Unremarkable. PROCEDURES   Unless otherwise noted below, none     Procedures    CRITICAL CARE TIME   N/A    CONSULTS:  None      EMERGENCY DEPARTMENT COURSE and DIFFERENTIAL DIAGNOSIS/MDM:   Vitals:    Vitals:    11/18/20 1954 11/18/20 2236 11/19/20 0008   BP: (!) 188/135 (!) 185/134 (!) 150/106   Pulse: 114 110 87   Resp: 16 16 21   Temp: 98 °F (36.7 °C)     SpO2: 98% 99% 98%   Weight: 135 lb (61.2 kg)     Height: 5' 1\" (1.549 m)         Patient was given thefollowing medications:  Medications - No data to display    45F, benign examination here for essential hypertension. I will start her back on antihypertensives. I will put her on a thiazide alone for right now give her a primary care referral.      FINAL IMPRESSION      1.  Essential hypertension          DISPOSITION/PLAN   DISPOSITION        PATIENT REFERREDTO:  [facility]) Pre-Services  [phone]          DISCHARGE MEDICATIONS:  New Prescriptions    HYDROCHLOROTHIAZIDE (HYDRODIURIL) 25 MG TABLET    Take 1 tablet by mouth daily       DISCONTINUED MEDICATIONS:  Discontinued Medications    No medications on file              (Please note that portions ofthis note were completed with a voice recognition program.  Efforts were made to edit the dictations but occasionally words are mis-transcribed.)    Maritza Cespedes MD (electronically signed)           Maritza Cespedes MD  11/19/20 2354

## 2021-02-14 PROCEDURE — 99283 EMERGENCY DEPT VISIT LOW MDM: CPT

## 2021-02-14 RX ORDER — METOPROLOL SUCCINATE 25 MG/1
25 TABLET, EXTENDED RELEASE ORAL DAILY
Status: ON HOLD | COMMUNITY
End: 2021-04-26 | Stop reason: HOSPADM

## 2021-02-14 RX ORDER — PANTOPRAZOLE SODIUM 40 MG/1
40 GRANULE, DELAYED RELEASE ORAL
COMMUNITY

## 2021-02-14 RX ORDER — DULOXETIN HYDROCHLORIDE 30 MG/1
30 CAPSULE, DELAYED RELEASE ORAL DAILY
COMMUNITY

## 2021-02-14 ASSESSMENT — PAIN SCALES - GENERAL: PAINLEVEL_OUTOF10: 8

## 2021-02-15 ENCOUNTER — HOSPITAL ENCOUNTER (EMERGENCY)
Age: 46
Discharge: HOME | End: 2021-02-15
Attending: EMERGENCY MEDICINE
Payer: COMMERCIAL

## 2021-02-15 VITALS
BODY MASS INDEX: 23.05 KG/M2 | OXYGEN SATURATION: 100 % | RESPIRATION RATE: 16 BRPM | HEART RATE: 81 BPM | TEMPERATURE: 97.4 F | DIASTOLIC BLOOD PRESSURE: 81 MMHG | SYSTOLIC BLOOD PRESSURE: 126 MMHG | WEIGHT: 122 LBS

## 2021-02-15 DIAGNOSIS — R10.9 ABDOMINAL CRAMPING: Primary | ICD-10-CM

## 2021-02-15 LAB
BASOPHILS ABSOLUTE: 0.1 K/UL (ref 0–0.2)
BASOPHILS RELATIVE PERCENT: 0.6 %
EOSINOPHILS ABSOLUTE: 0.3 K/UL (ref 0–0.6)
EOSINOPHILS RELATIVE PERCENT: 2.6 %
HCT VFR BLD CALC: 41.3 % (ref 36–48)
HEMOGLOBIN: 13.6 G/DL (ref 12–16)
LYMPHOCYTES ABSOLUTE: 3.9 K/UL (ref 1–5.1)
LYMPHOCYTES RELATIVE PERCENT: 36.8 %
MCH RBC QN AUTO: 29.7 PG (ref 26–34)
MCHC RBC AUTO-ENTMCNC: 32.8 G/DL (ref 31–36)
MCV RBC AUTO: 90.4 FL (ref 80–100)
MONOCYTES ABSOLUTE: 0.6 K/UL (ref 0–1.3)
MONOCYTES RELATIVE PERCENT: 5.4 %
NEUTROPHILS ABSOLUTE: 5.7 K/UL (ref 1.7–7.7)
NEUTROPHILS RELATIVE PERCENT: 54.6 %
OCCULT BLOOD DIAGNOSTIC: NORMAL
PDW BLD-RTO: 13 % (ref 12.4–15.4)
PLATELET # BLD: 280 K/UL (ref 135–450)
PMV BLD AUTO: 10.4 FL (ref 5–10.5)
RBC # BLD: 4.57 M/UL (ref 4–5.2)
WBC # BLD: 10.5 K/UL (ref 4–11)

## 2021-02-15 PROCEDURE — G0328 FECAL BLOOD SCRN IMMUNOASSAY: HCPCS

## 2021-02-15 PROCEDURE — 85025 COMPLETE CBC W/AUTO DIFF WBC: CPT

## 2021-02-15 ASSESSMENT — ENCOUNTER SYMPTOMS
ABDOMINAL PAIN: 1
ALLERGIC/IMMUNOLOGIC NEGATIVE: 1
RESPIRATORY NEGATIVE: 1
EYES NEGATIVE: 1

## 2021-02-15 NOTE — ED PROVIDER NOTES
[facility]        Pt Name: Annie Hale  MRN: [id number]  [place] 1975  Date of evaluation: 2/14/2021  Provider: Jorge Howard MD  PCP: No primary care provider on file. CHIEF COMPLAINT       Chief Complaint   Patient presents with    Rectal Bleeding     pt has had blood in stools x days. Also c/o headache and generalized body aches especially in stomach and back       HISTORY OF PRESENT ILLNESS   (Location/Symptom, Timing/Onset, Context/Setting, Quality, Duration, Modifying Factors, Severity)  Note limiting factors. Annie Hale is a 54 y.o. female with blood in the stool. She also states she has a headache and abdominal cramping. She noticed the blood in her stool earlier today. No lightheadedness or dizziness but she does not feel well. No fever. Is not having to her before. Nursing Notes were all reviewed and agreed with or any disagreements were addressed  in the HPI. REVIEW OF SYSTEMS    (2-9 systems for level 4, 10 or more for level 5)     Review of Systems   Constitutional: Negative. HENT: Negative. Eyes: Negative. Respiratory: Negative. Cardiovascular: Negative. Gastrointestinal: Positive for abdominal pain. Endocrine: Negative. Genitourinary: Negative. Musculoskeletal: Negative. Skin: Negative. Allergic/Immunologic: Negative. Neurological: Positive for headaches. Hematological: Negative. Psychiatric/Behavioral: Negative. PAST MEDICAL HISTORY     Past Medical History:   Diagnosis Date    GERD (gastroesophageal reflux disease)     Hyperlipidemia     Hypertension        SURGICAL HISTORY   History reviewed. No pertinent surgical history.     CURRENTMEDICATIONS       Previous Medications    AMITRIPTYLINE (ELAVIL) 10 MG TABLET    Take 10 mg by mouth nightly    DULOXETINE (CYMBALTA) 30 MG EXTENDED RELEASE CAPSULE    Take 30 mg by mouth daily Relationship status: None    Intimate partner violence     Fear of current or ex partner: None     Emotionally abused: None     Physically abused: None     Forced sexual activity: None   Other Topics Concern    None   Social History Narrative    None       SCREENINGS             PHYSICAL EXAM    (up to 7 for level 4, 8 or more for level 5)     ED Triage Vitals [02/14/21 2315]   BP Temp Temp src Pulse Resp SpO2 Height Weight   (!) 146/98 97.4 °F (36.3 °C) -- 106 20 99 % -- 122 lb (55.3 kg)       Physical Exam  Vitals signs and nursing note reviewed. Constitutional:       Appearance: She is normal weight. HENT:      Head: Normocephalic and atraumatic. Right Ear: External ear normal.      Left Ear: External ear normal.      Nose: Nose normal.      Mouth/Throat:      Mouth: Mucous membranes are moist.      Pharynx: Oropharynx is clear. Eyes:      Extraocular Movements: Extraocular movements intact. Conjunctiva/sclera: Conjunctivae normal.   Neck:      Musculoskeletal: Normal range of motion and neck supple. Cardiovascular:      Rate and Rhythm: Normal rate and regular rhythm. Pulses: Normal pulses. Heart sounds: Normal heart sounds. Pulmonary:      Effort: Pulmonary effort is normal.      Breath sounds: Normal breath sounds. Abdominal:      General: Abdomen is flat. Palpations: Abdomen is soft. Tenderness: There is no abdominal tenderness. Musculoskeletal: Normal range of motion. Skin:     General: Skin is warm. Capillary Refill: Capillary refill takes less than 2 seconds. Neurological:      General: No focal deficit present. Mental Status: She is alert.    Psychiatric:         Mood and Affect: Mood normal.         DIAGNOSTIC RESULTS   LABS:    Labs Reviewed   CBC WITH AUTO DIFFERENTIAL    Narrative:     Performed at:  [facility] [address], [place], [address]   Phone [phone]   BLOOD OCCULT STOOL DIAGNOSTIC Narrative:     ORDER#: [id number]                          ORDERED BY: Janeth Driscoll  SOURCE: Stool SOFT                         COLLECTED:  02/15/21 02:00  ANTIBIOTICS AT DEANDRE.:                      RECEIVED :  02/15/21 02:05  Performed at:  [facility] [address],  [place], [address]   Phone [phone]       All other labs were within normal range or not returned as of this dictation. EKG:       RADIOLOGY:        Interpretation per the Radiologist below, if available at the time of this note:    No orders to display     No results found. PROCEDURES   Unless otherwise noted below, none     Procedures    CRITICAL CARE TIME   N/A    CONSULTS:  None      EMERGENCY DEPARTMENT COURSE and DIFFERENTIAL DIAGNOSIS/MDM:   Vitals:    Vitals:    02/14/21 2315   BP: (!) 146/98   Pulse: 106   Resp: 20   Temp: 97.4 °F (36.3 °C)   SpO2: 99%   Weight: 122 lb (55.3 kg)       Patient was given thefollowing medications:  Medications - No data to display        Stool guaiac is negative and hemoglobin is normal.  Patient's exam was essentially unremarkable except for a long pedunculated lesion on the labia majora. She did not have any bleeding. I think she can be discharged home in good condition to follow-up with primary care. Maybe when she saw was some digested food or something like that, but there is no blood today. Her vital signs are stable. She is discharged     FINAL IMPRESSION      1.  Abdominal cramping          DISPOSITION/PLAN   DISPOSITION Decision To Discharge 02/15/2021 02:19:33 AM      PATIENT REFERREDTO:  [facility]) Pre-Services  [phone]          DISCHARGE MEDICATIONS:  New Prescriptions    No medications on file       DISCONTINUED MEDICATIONS:  Discontinued Medications    No medications on file (Please note that portions ofthis note were completed with a voice recognition program.  Efforts were made to edit the dictations but occasionally words are mis-transcribed.)    Dawn Quesada MD (electronically signed)              Dawn Quesada MD  02/15/21 9831

## 2021-02-15 NOTE — ED NOTES
Pt c/o abdominal discomfort with blood noted in stool at home - pt had a picture. Rectal exam done with hemoccult specimen obtained. Saline lock inserted with blood drawn and sent to lab. No vomiting noted. Call bell in reach.      Arik Courtney RN  02/15/21 8404

## 2021-04-22 ENCOUNTER — APPOINTMENT (OUTPATIENT)
Dept: CT IMAGING | Age: 46
DRG: 426 | End: 2021-04-22
Payer: COMMERCIAL

## 2021-04-22 ENCOUNTER — APPOINTMENT (OUTPATIENT)
Dept: GENERAL RADIOLOGY | Age: 46
DRG: 426 | End: 2021-04-22
Payer: COMMERCIAL

## 2021-04-22 ENCOUNTER — HOSPITAL ENCOUNTER (INPATIENT)
Age: 46
LOS: 4 days | Discharge: HOME HEALTH | DRG: 426 | End: 2021-04-26
Attending: EMERGENCY MEDICINE | Admitting: INTERNAL MEDICINE
Payer: COMMERCIAL

## 2021-04-22 DIAGNOSIS — Z87.19 HISTORY OF CONSTIPATION: ICD-10-CM

## 2021-04-22 DIAGNOSIS — E87.6 HYPOKALEMIA: ICD-10-CM

## 2021-04-22 DIAGNOSIS — G93.41 METABOLIC ENCEPHALOPATHY: Primary | ICD-10-CM

## 2021-04-22 DIAGNOSIS — R56.9 WITNESSED SEIZURE-LIKE ACTIVITY (HCC): ICD-10-CM

## 2021-04-22 DIAGNOSIS — E87.1 HYPONATREMIA: ICD-10-CM

## 2021-04-22 PROBLEM — E11.9 TYPE 2 DIABETES MELLITUS WITHOUT COMPLICATION, WITHOUT LONG-TERM CURRENT USE OF INSULIN (HCC): Status: ACTIVE | Noted: 2018-01-25

## 2021-04-22 LAB
A/G RATIO: 1.3 (ref 1.1–2.2)
ACETAMINOPHEN LEVEL: <5 UG/ML (ref 10–30)
ALBUMIN SERPL-MCNC: 4.5 G/DL (ref 3.4–5)
ALP BLD-CCNC: 91 U/L (ref 40–129)
ALT SERPL-CCNC: 24 U/L (ref 10–40)
AMMONIA: 23 UMOL/L (ref 11–51)
AMPHETAMINE SCREEN, URINE: NORMAL
ANION GAP SERPL CALCULATED.3IONS-SCNC: 12 MMOL/L (ref 3–16)
AST SERPL-CCNC: 25 U/L (ref 15–37)
BARBITURATE SCREEN URINE: NORMAL
BASE EXCESS VENOUS: 3.3 MMOL/L (ref -3–3)
BASOPHILS ABSOLUTE: 0.1 K/UL (ref 0–0.2)
BASOPHILS RELATIVE PERCENT: 0.7 %
BENZODIAZEPINE SCREEN, URINE: NORMAL
BILIRUB SERPL-MCNC: 1.3 MG/DL (ref 0–1)
BILIRUBIN URINE: NEGATIVE
BLOOD, URINE: NEGATIVE
BUN BLDV-MCNC: 9 MG/DL (ref 7–20)
CALCIUM SERPL-MCNC: 9.6 MG/DL (ref 8.3–10.6)
CANNABINOID SCREEN URINE: NORMAL
CARBOXYHEMOGLOBIN: 2.3 % (ref 0–1.5)
CHLORIDE BLD-SCNC: 85 MMOL/L (ref 99–110)
CLARITY: CLEAR
CO2: 26 MMOL/L (ref 21–32)
COCAINE METABOLITE SCREEN URINE: NORMAL
COLOR: YELLOW
CREAT SERPL-MCNC: 0.5 MG/DL (ref 0.6–1.1)
EOSINOPHILS ABSOLUTE: 0.2 K/UL (ref 0–0.6)
EOSINOPHILS RELATIVE PERCENT: 1.8 %
EPITHELIAL CELLS, UA: 3 /HPF (ref 0–5)
ETHANOL: NORMAL MG/DL (ref 0–0.08)
GFR AFRICAN AMERICAN: >60
GFR NON-AFRICAN AMERICAN: >60
GLOBULIN: 3.4 G/DL
GLUCOSE BLD-MCNC: 131 MG/DL (ref 70–99)
GLUCOSE URINE: NEGATIVE MG/DL
HCG QUALITATIVE: NEGATIVE
HCO3 VENOUS: 28.3 MMOL/L (ref 23–29)
HCT VFR BLD CALC: 41.3 % (ref 36–48)
HEMOGLOBIN, VEN, REDUCED: 8 %
HEMOGLOBIN: 14.3 G/DL (ref 12–16)
HYALINE CASTS: 0 /LPF (ref 0–8)
INR BLD: 0.99 (ref 0.86–1.14)
KETONES, URINE: NEGATIVE MG/DL
LACTIC ACID, SEPSIS: 1.1 MMOL/L (ref 0.4–1.9)
LEUKOCYTE ESTERASE, URINE: ABNORMAL
LIPASE: 70 U/L (ref 13–60)
LYMPHOCYTES ABSOLUTE: 2.1 K/UL (ref 1–5.1)
LYMPHOCYTES RELATIVE PERCENT: 25 %
Lab: NORMAL
MAGNESIUM: 1.9 MG/DL (ref 1.8–2.4)
MCH RBC QN AUTO: 30.1 PG (ref 26–34)
MCHC RBC AUTO-ENTMCNC: 34.7 G/DL (ref 31–36)
MCV RBC AUTO: 86.8 FL (ref 80–100)
METHADONE SCREEN, URINE: NORMAL
METHEMOGLOBIN VENOUS: 0.4 %
MICROSCOPIC EXAMINATION: YES
MONOCYTES ABSOLUTE: 0.8 K/UL (ref 0–1.3)
MONOCYTES RELATIVE PERCENT: 10.1 %
NEUTROPHILS ABSOLUTE: 5.2 K/UL (ref 1.7–7.7)
NEUTROPHILS RELATIVE PERCENT: 62.4 %
NITRITE, URINE: NEGATIVE
O2 CONTENT, VEN: 18 VOL %
O2 SAT, VEN: 92 %
O2 THERAPY: ABNORMAL
OPIATE SCREEN URINE: NORMAL
OXYCODONE URINE: NORMAL
PCO2, VEN: 43.4 MMHG (ref 40–50)
PDW BLD-RTO: 12.3 % (ref 12.4–15.4)
PH UA: 7.5
PH UA: 7.5 (ref 5–8)
PH VENOUS: 7.42 (ref 7.35–7.45)
PHENCYCLIDINE SCREEN URINE: NORMAL
PLATELET # BLD: 412 K/UL (ref 135–450)
PMV BLD AUTO: 8.6 FL (ref 5–10.5)
PO2, VEN: 58.4 MMHG (ref 25–40)
POTASSIUM REFLEX MAGNESIUM: 3.1 MMOL/L (ref 3.5–5.1)
PRO-BNP: 56 PG/ML (ref 0–124)
PROPOXYPHENE SCREEN: NORMAL
PROTEIN UA: NEGATIVE MG/DL
PROTHROMBIN TIME: 11.5 SEC (ref 10–13.2)
RBC # BLD: 4.76 M/UL (ref 4–5.2)
RBC UA: 1 /HPF (ref 0–4)
SALICYLATE, SERUM: <0.3 MG/DL (ref 15–30)
SARS-COV-2, NAAT: NOT DETECTED
SODIUM BLD-SCNC: 122 MMOL/L (ref 136–145)
SODIUM BLD-SCNC: 123 MMOL/L (ref 136–145)
SPECIFIC GRAVITY UA: 1.01 (ref 1–1.03)
TCO2 CALC VENOUS: 67 MMOL/L
TOTAL PROTEIN: 7.9 G/DL (ref 6.4–8.2)
TROPONIN: <0.01 NG/ML
URINE REFLEX TO CULTURE: ABNORMAL
URINE TYPE: ABNORMAL
UROBILINOGEN, URINE: 1 E.U./DL
WBC # BLD: 8.3 K/UL (ref 4–11)
WBC UA: 2 /HPF (ref 0–5)

## 2021-04-22 PROCEDURE — 80307 DRUG TEST PRSMV CHEM ANLYZR: CPT

## 2021-04-22 PROCEDURE — 83605 ASSAY OF LACTIC ACID: CPT

## 2021-04-22 PROCEDURE — 70450 CT HEAD/BRAIN W/O DYE: CPT

## 2021-04-22 PROCEDURE — 84300 ASSAY OF URINE SODIUM: CPT

## 2021-04-22 PROCEDURE — 84443 ASSAY THYROID STIM HORMONE: CPT

## 2021-04-22 PROCEDURE — 6360000004 HC RX CONTRAST MEDICATION: Performed by: EMERGENCY MEDICINE

## 2021-04-22 PROCEDURE — 87040 BLOOD CULTURE FOR BACTERIA: CPT

## 2021-04-22 PROCEDURE — 96375 TX/PRO/DX INJ NEW DRUG ADDON: CPT

## 2021-04-22 PROCEDURE — 85610 PROTHROMBIN TIME: CPT

## 2021-04-22 PROCEDURE — 82077 ASSAY SPEC XCP UR&BREATH IA: CPT

## 2021-04-22 PROCEDURE — 81001 URINALYSIS AUTO W/SCOPE: CPT

## 2021-04-22 PROCEDURE — 82436 ASSAY OF URINE CHLORIDE: CPT

## 2021-04-22 PROCEDURE — 83735 ASSAY OF MAGNESIUM: CPT

## 2021-04-22 PROCEDURE — 80179 DRUG ASSAY SALICYLATE: CPT

## 2021-04-22 PROCEDURE — 71260 CT THORAX DX C+: CPT

## 2021-04-22 PROCEDURE — 2060000000 HC ICU INTERMEDIATE R&B

## 2021-04-22 PROCEDURE — 2580000003 HC RX 258: Performed by: PHYSICIAN ASSISTANT

## 2021-04-22 PROCEDURE — 93005 ELECTROCARDIOGRAM TRACING: CPT | Performed by: PHYSICIAN ASSISTANT

## 2021-04-22 PROCEDURE — 36415 COLL VENOUS BLD VENIPUNCTURE: CPT

## 2021-04-22 PROCEDURE — 84484 ASSAY OF TROPONIN QUANT: CPT

## 2021-04-22 PROCEDURE — 83930 ASSAY OF BLOOD OSMOLALITY: CPT

## 2021-04-22 PROCEDURE — 83935 ASSAY OF URINE OSMOLALITY: CPT

## 2021-04-22 PROCEDURE — 96374 THER/PROPH/DIAG INJ IV PUSH: CPT

## 2021-04-22 PROCEDURE — 82570 ASSAY OF URINE CREATININE: CPT

## 2021-04-22 PROCEDURE — 99284 EMERGENCY DEPT VISIT MOD MDM: CPT

## 2021-04-22 PROCEDURE — 6360000002 HC RX W HCPCS: Performed by: PHYSICIAN ASSISTANT

## 2021-04-22 PROCEDURE — 87635 SARS-COV-2 COVID-19 AMP PRB: CPT

## 2021-04-22 PROCEDURE — 82140 ASSAY OF AMMONIA: CPT

## 2021-04-22 PROCEDURE — 85025 COMPLETE CBC W/AUTO DIFF WBC: CPT

## 2021-04-22 PROCEDURE — 71045 X-RAY EXAM CHEST 1 VIEW: CPT

## 2021-04-22 PROCEDURE — 84133 ASSAY OF URINE POTASSIUM: CPT

## 2021-04-22 PROCEDURE — 82803 BLOOD GASES ANY COMBINATION: CPT

## 2021-04-22 PROCEDURE — 6370000000 HC RX 637 (ALT 250 FOR IP): Performed by: PHYSICIAN ASSISTANT

## 2021-04-22 PROCEDURE — 80143 DRUG ASSAY ACETAMINOPHEN: CPT

## 2021-04-22 PROCEDURE — 84703 CHORIONIC GONADOTROPIN ASSAY: CPT

## 2021-04-22 PROCEDURE — 83690 ASSAY OF LIPASE: CPT

## 2021-04-22 PROCEDURE — 84295 ASSAY OF SERUM SODIUM: CPT

## 2021-04-22 PROCEDURE — 84439 ASSAY OF FREE THYROXINE: CPT

## 2021-04-22 PROCEDURE — 83880 ASSAY OF NATRIURETIC PEPTIDE: CPT

## 2021-04-22 PROCEDURE — 80053 COMPREHEN METABOLIC PANEL: CPT

## 2021-04-22 RX ORDER — POLYETHYLENE GLYCOL 3350 17 G/17G
17 POWDER, FOR SOLUTION ORAL DAILY PRN
COMMUNITY

## 2021-04-22 RX ORDER — SODIUM CHLORIDE 9 MG/ML
INJECTION, SOLUTION INTRAVENOUS CONTINUOUS
Status: CANCELLED | OUTPATIENT
Start: 2021-04-22

## 2021-04-22 RX ORDER — FENTANYL CITRATE 50 UG/ML
50 INJECTION, SOLUTION INTRAMUSCULAR; INTRAVENOUS ONCE
Status: COMPLETED | OUTPATIENT
Start: 2021-04-22 | End: 2021-04-22

## 2021-04-22 RX ORDER — SODIUM CHLORIDE 9 MG/ML
INJECTION, SOLUTION INTRAVENOUS CONTINUOUS
Status: DISCONTINUED | OUTPATIENT
Start: 2021-04-22 | End: 2021-04-23

## 2021-04-22 RX ORDER — POTASSIUM CHLORIDE 20 MEQ/1
40 TABLET, EXTENDED RELEASE ORAL ONCE
Status: COMPLETED | OUTPATIENT
Start: 2021-04-22 | End: 2021-04-22

## 2021-04-22 RX ORDER — M-VIT,TX,IRON,MINS/CALC/FOLIC 27MG-0.4MG
1 TABLET ORAL DAILY
COMMUNITY

## 2021-04-22 RX ORDER — ATORVASTATIN CALCIUM 20 MG/1
20 TABLET, FILM COATED ORAL DAILY
COMMUNITY

## 2021-04-22 RX ORDER — ONDANSETRON 2 MG/ML
4 INJECTION INTRAMUSCULAR; INTRAVENOUS ONCE
Status: COMPLETED | OUTPATIENT
Start: 2021-04-22 | End: 2021-04-22

## 2021-04-22 RX ORDER — SUMATRIPTAN 25 MG/1
25 TABLET, FILM COATED ORAL
COMMUNITY

## 2021-04-22 RX ORDER — TIZANIDINE 2 MG/1
2 TABLET ORAL EVERY 6 HOURS PRN
COMMUNITY

## 2021-04-22 RX ADMIN — FENTANYL CITRATE 50 MCG: 50 INJECTION, SOLUTION INTRAMUSCULAR; INTRAVENOUS at 18:34

## 2021-04-22 RX ADMIN — SODIUM CHLORIDE: 9 INJECTION, SOLUTION INTRAVENOUS at 18:33

## 2021-04-22 RX ADMIN — IOPAMIDOL 75 ML: 755 INJECTION, SOLUTION INTRAVENOUS at 18:07

## 2021-04-22 RX ADMIN — POTASSIUM CHLORIDE 40 MEQ: 1500 TABLET, EXTENDED RELEASE ORAL at 18:33

## 2021-04-22 RX ADMIN — ONDANSETRON 4 MG: 2 INJECTION INTRAMUSCULAR; INTRAVENOUS at 18:34

## 2021-04-22 ASSESSMENT — PAIN DESCRIPTION - LOCATION: LOCATION: HEAD

## 2021-04-22 ASSESSMENT — ENCOUNTER SYMPTOMS
CHEST TIGHTNESS: 0
ABDOMINAL PAIN: 1
NAUSEA: 0
DIARRHEA: 0
SHORTNESS OF BREATH: 0
BACK PAIN: 1
VOMITING: 0
CONSTIPATION: 1

## 2021-04-22 ASSESSMENT — PAIN SCALES - GENERAL
PAINLEVEL_OUTOF10: 5
PAINLEVEL_OUTOF10: 8
PAINLEVEL_OUTOF10: 8

## 2021-04-22 NOTE — ED NOTES
Bed: 07  Expected date:   Expected time:   Means of arrival:   Comments:  Chase Gonzalez RN  04/22/21 2497

## 2021-04-22 NOTE — CONSULTS
MD Emelia Hameed MD Rubin Blue, MD                Office: [phone]                      Fax: [phone]               [email or web address]                   Nephrology consult received for hyponatremia. Brief Summary Note -- full consult report will follow. Chart reviewed. Discussed with Petrona Lara PA-C  Thank you for allowing me to participate in this patient's care. Please do not hesitate to contact me anytime. We will follow along with you. Collin Schultz MD  [facility] [address] [place] [address]   [phone] or [facility]    =====================================================    Brief / Initial Impression:   Hyponatremia, moderate range,  -Likely acute, as recent labs about 2 months ago showed normal Na, no history of hyponatremia in the past,  -With neurological symptoms such as seizures, weakness, with history of seizures in the past,  -Risk factors- ? Hypovolemia, hypokalemia, hydrochlorothiazide, SSRI use    -Check osmolality, urine lites,  -Frequent sodium check  -Goal serum sodium increase around 8-10 points in 24 hours  -Close monitoring of neurological status    -Replete potassium, will help to increase osmolarity,  -Normal saline at 75 cc/hour      Active Problems:    * No active hospital problems. *  Resolved Problems:    * No resolved hospital problems. *          Labs reviewed by me     CBC:   Recent Labs     04/22/21  1550   WBC 8.3   HGB 14.3   HCT 41.3   MCV 86.8        BMP:   Recent Labs     04/22/21  1550   *   K 3.1*   CL 85*   CO2 26   BUN 9   CREATININE 0.5*     Magnesium:   Lab Results   Component Value Date    MG 1.90 04/22/2021       Arterial Blood Gasses  No results for input(s): PH, PCO2, PO2 in the last 72 hours.     Invalid input(s): Nav العراقي    UA:No results for input(s): NITRITE, COLORU, PHUR, LABCAST, WBCUA, RBCUA, MUCUS, TRICHOMONAS, YEAST, BACTERIA, CLARITYU, SPECGRAV, LEUKOCYTESUR, UROBILINOGEN, BILIRUBINUR, BLOODU, GLUCOSEU, AMORPHOUS in the last 72 hours. Invalid input(s): Nellie Ards    LIVER PROFILE:   Recent Labs     04/22/21  1550   AST 25   ALT 24   LIPASE 70.0*   BILITOT 1.3*   ALKPHOS 91     PT/INR:    Lab Results   Component Value Date    PROTIME 11.5 04/22/2021    PROTIME 11.1 10/11/2020    INR 0.99 04/22/2021    INR 0.96 10/11/2020     PTT:  No results found for: APTT  JOSH:  No results found for: ANATITER, JOSH        RADIOLOGY:    Xr Chest Portable    Result Date: 4/22/2021  EXAMINATION: ONE XRAY VIEW OF THE CHEST 4/22/2021 3:58 pm COMPARISON: 11/18/2020 HISTORY: ORDERING SYSTEM PROVIDED HISTORY: AMS TECHNOLOGIST PROVIDED HISTORY: Reason for exam:->AMS Reason for Exam: Altered Mental Status (pt brought in by family with c/o headache and altered mental status. daughter states pt not acting herself. states dad described a seizure like activity 2 days ago ) Acuity: Acute Type of Exam: Initial FINDINGS: The cardiac silhouette, mediastinal and hilar contours are normal.  The lungs are clear. There are no pleural effusions. No acute osseous abnormalities are identified. No acute cardiopulmonary disease. Imaging Results.   Chest X Ray reviwed by me

## 2021-04-22 NOTE — CONSULTS
and altered mental status. daughter states pt not acting herself. states dad described a seizure like activity 2 days ago      History Obtained From:  patient + treatment team + Electronic Medical Records    HPI: Ms. Lola Hashimoto is a 54 y.o. female with significant past medical history as below:   Past Medical History:   Diagnosis Date    GERD (gastroesophageal reflux disease)     Hyperlipidemia     Hypertension       Presents with Altered Mental Status (pt brought in by family with c/o headache and altered mental status. daughter states pt not acting herself. states dad described a seizure like activity 2 days ago )    Admitted with Hyponatremia [E87.1] , so we are called for that. No current active complaints. Patient denied chest pain / dizziness/lightheadedness/syncope/ SOB / leg edema. Regarding: BOB on CKD  · Duration: acute on chronic  · Location: kidneys  · Severity: Severe   · Timing: continous  · Context (ex: related to condition): , refer to my assessment / impression. · Modifying factors (ex: medications, interventions): , refer to my plan / recommendation. · Associated signs & symptoms (ex: edema, SOB): As mentioned above in CC and HPI      Past medical, Surgical, Social, Family medical history reviewed by me. PAST MEDICAL HISTORY:   Past Medical History:   Diagnosis Date    GERD (gastroesophageal reflux disease)     Hyperlipidemia     Hypertension      PAST SURGICAL HISTORY:   Past Surgical History:   Procedure Laterality Date    ABDOMEN SURGERY      BACK SURGERY      4 nodules removed     FAMILY HISTORY: History reviewed. No pertinent family history.   SOCIAL HISTORY:   Social History     Socioeconomic History    Marital status:      Spouse name: None    Number of children: None    Years of education: None    Highest education level: None   Occupational History    None   Social Needs    Financial resource strain: None    Food insecurity     Worry: None     Inability: None  Transportation needs     Medical: None     Non-medical: None   Tobacco Use    Smoking status: Never Smoker    Smokeless tobacco: Never Used   Substance and Sexual Activity    Alcohol use: No    Drug use: No    Sexual activity: None   Lifestyle    Physical activity     Days per week: None     Minutes per session: None    Stress: None   Relationships    Social connections     Talks on phone: None     Gets together: None     Attends Orthodox service: None     Active member of club or organization: None     Attends meetings of clubs or organizations: None     Relationship status: None    Intimate partner violence     Fear of current or ex partner: None     Emotionally abused: None     Physically abused: None     Forced sexual activity: None   Other Topics Concern    None   Social History Narrative    None     MEDICATIONS: reviewed by me. Medications Prior to Admission:  No current facility-administered medications on file prior to encounter.       Current Outpatient Medications on File Prior to Encounter   Medication Sig Dispense Refill    pantoprazole sodium (PROTONIX) 40 MG PACK packet Take 40 mg by mouth every morning (before breakfast)      DULoxetine (CYMBALTA) 30 MG extended release capsule Take 30 mg by mouth daily      metoprolol succinate (TOPROL XL) 25 MG extended release tablet Take 25 mg by mouth daily      hydroCHLOROthiazide (HYDRODIURIL) 25 MG tablet Take 1 tablet by mouth daily 30 tablet 0    amitriptyline (ELAVIL) 10 MG tablet Take 10 mg by mouth nightly      melatonin 3 MG TABS tablet Take 3 mg by mouth nightly as needed      topiramate (TOPAMAX) 100 MG tablet Take 100 mg by mouth 2 times daily      loratadine (CLARITIN) 10 MG capsule Take 10 mg by mouth daily      metFORMIN (GLUCOPHAGE) 500 MG tablet Take 500 mg by mouth 2 times daily (with meals)      fluticasone (FLONASE) 50 MCG/ACT nasal spray 2 sprays by Nasal route daily 1 Bottle 0    omeprazole (PRILOSEC) 20 MG every 8 hours as needed for Nausea, Disp: 20 tablet, Rfl: 0      Allergies reviewed by me: Evette Lieu [aspirin]    REVIEW OF SYSTEMS:  As mentioned in chief complaint and HPI , Subjective   All other 10-point review of systems: negative. PHYSICAL EXAM:  Recent vital signs and recent I/Os reviewed by me. Wt Readings from Last 3 Encounters:   04/22/21 106 lb (48.1 kg)   02/14/21 122 lb (55.3 kg)   11/18/20 135 lb (61.2 kg)     BP Readings from Last 3 Encounters:   04/22/21 124/89   02/15/21 126/81   11/19/20 (!) 150/106     Patient Vitals for the past 24 hrs:   BP Temp Temp src Pulse Resp SpO2 Height Weight   04/22/21 1544 124/89 97.7 °F (36.5 °C) Oral 94 18 96 % 5' 4\" (1.626 m) 106 lb (48.1 kg)     No intake or output data in the 24 hours ending 04/22/21 1755      Physical Exam  Vitals signs reviewed. Constitutional:       General: She is not in acute distress. HENT:      Head: Normocephalic and atraumatic. Right Ear: External ear normal.      Left Ear: External ear normal.      Nose: Nose normal.      Mouth/Throat:      Mouth: Mucous membranes are not dry. Eyes:      General: No scleral icterus. Conjunctiva/sclera: Conjunctivae normal.   Neck:      Musculoskeletal: Normal range of motion and neck supple. Vascular: No JVD. Cardiovascular:      Rate and Rhythm: Normal rate and regular rhythm. Heart sounds: S1 normal and S2 normal.   Pulmonary:      Effort: Pulmonary effort is normal. No respiratory distress. Breath sounds: Normal breath sounds. Abdominal:      General: Bowel sounds are normal.      Palpations: Abdomen is soft. Musculoskeletal:         General: No swelling or deformity. Skin:     General: Skin is warm and dry. Neurological:      Mental Status: She is alert and oriented to person, place, and time. Mental status is at baseline.    Psychiatric:         Mood and Affect: Mood normal.         Behavior: Behavior normal.            DATA:  Diagnostic tests reviewed by me for today's visit:   (AS NEEDED FOR MY EVALUATION AND MANAGEMENT). Recent Labs     04/22/21  1550   WBC 8.3   HCT 41.3        Iron Saturation:  No components found for: PERCENTFE  FERRITIN:  No results found for: FERRITIN  IRON:  No results found for: IRON  TIBC:  No results found for: TIBC    Recent Labs     04/22/21  1550   *   K 3.1*   CL 85*   CO2 26   BUN 9   CREATININE 0.5*     Recent Labs     04/22/21  1550   CALCIUM 9.6   MG 1.90     No results for input(s): PH, PCO2, PO2 in the last 72 hours.     Invalid input(s): Lendon Ivanoff    ABG:  No results found for: PH, PCO2, PO2, HCO3, BE, THGB, TCO2, O2SAT  VBG:    Lab Results   Component Value Date    PHVEN 7.423 04/22/2021    WAK8PVO 43.4 04/22/2021    BEVEN 3.3 04/22/2021    J0VZMRVA 92 04/22/2021       LDH:  No results found for: LDH  Uric Acid:  No results found for: LABURIC, URICACID    PT/INR:    Lab Results   Component Value Date    PROTIME 11.5 04/22/2021    INR 0.99 04/22/2021     Warfarin PT/INR:  No components found for: PTPATWAR, PTINRWAR  PTT:  No results found for: APTT, PTT[APTT}  Last 3 Troponin:    Lab Results   Component Value Date    TROPONINI <0.01 04/22/2021    TROPONINI <0.01 11/18/2020    TROPONINI <0.01 10/12/2020       U/A:    Lab Results   Component Value Date    COLORU YELLOW 11/18/2020    PROTEINU Negative 11/18/2020    PHUR 7.0 11/18/2020    WBCUA 3 11/18/2020    RBCUA 1 11/18/2020    BACTERIA RARE 11/18/2020    CLARITYU Clear 11/18/2020    SPECGRAV <1.005 11/18/2020    LEUKOCYTESUR TRACE 11/18/2020    UROBILINOGEN 0.2 11/18/2020    BILIRUBINUR Negative 11/18/2020    BLOODU TRACE 11/18/2020    GLUCOSEU Negative 11/18/2020     Microalbumen/Creatinine ratio:  No components found for: RUCREAT  24 Hour Urine for Protein:  No components found for: RAWUPRO, UHRS3, XIHD57IW, UTV3  24 Hour Urine for Creatinine Clearance:  No components found for: CREAT4, UHRS10, UTV10  Urine Toxicology:  No components found for: IAMMENTA, IBARBIT, IBENZO, ICOCAINE, IMARTHC, IOPIATES, IPHENCYC    HgBA1c:    Lab Results   Component Value Date    LABA1C 7.3 10/12/2020     RPR:  No results found for: RPR  HIV:  No results found for: HIV  JOSH:  No results found for: ANATITER, JOSH  RF:  No results found for: RF  DSDNA:  No components found for: DNA  AMYLASE:  No results found for: AMYLASE  LIPASE:    Lab Results   Component Value Date    LIPASE 70.0 04/22/2021     Fibrinogen Level:  No components found for: FIB       BELOW MENTIONED RADIOLOGY STUDY RESULTS BY ME (AS NEEDED FOR MY EVALUATION AND MANAGEMENT). Xr Chest Portable    Result Date: 4/22/2021  EXAMINATION: ONE XRAY VIEW OF THE CHEST 4/22/2021 3:58 pm COMPARISON: 11/18/2020 HISTORY: ORDERING SYSTEM PROVIDED HISTORY: AMS TECHNOLOGIST PROVIDED HISTORY: Reason for exam:->AMS Reason for Exam: Altered Mental Status (pt brought in by family with c/o headache and altered mental status. daughter states pt not acting herself. states dad described a seizure like activity 2 days ago ) Acuity: Acute Type of Exam: Initial FINDINGS: The cardiac silhouette, mediastinal and hilar contours are normal.  The lungs are clear. There are no pleural effusions. No acute osseous abnormalities are identified. No acute cardiopulmonary disease.

## 2021-04-22 NOTE — ED PROVIDER NOTES
I independently performed a history and physical on Lobo Weinberg. All diagnostic, treatment, and disposition decisions were made by myself in conjunction with the advanced practice provider. Briefly, this is a 54 y.o. female here for seizure-like activity witnessed by family. Family describes generalized shaking and persistent confusion after the episode. Since then, patient is complaining of headache that she rates 8/10. She has also had poor appetite and approximately 16 lbs of weight loss since last ED visit on 2/14/2021. On exam, patient is well appearing, non-toxic, and in no acute distress. PERRLA. Face symmetric without droop or paralysis. Heart is RRR. Lungs are CTAB. Moves all extremities well. EKG  The Ekg interpreted by me in the absence of a cardiologist shows. normal sinus rhythm with a rate of 88  Axis is   Normal  QTc is  normal  Intervals and Durations are unremarkable. No specific ST-T wave changes appreciated. No evidence of acute ischemia. No significant change from prior EKG dated 11/18/2020    Screenings  NIH Stroke Scale  NIH Stroke Scale Assessed: Yes  Interval: Baseline  Level of Consciousness (1a. ): Alert  LOC Questions (1b. ): Answers both correctly  LOC Commands (1c. ): Performs one task correctly  Best Gaze (2. ): Normal  Visual (3. ): No visual loss  Facial Palsy (4. ): Normal symmetrical movement  Motor Arm, Left (5a. ): No drift  Motor Arm, Right (5b. ): No drift  Motor Leg, Left (6a. ): No drift  Motor Leg, Right (6b. ): No drift  Limb Ataxia (7. ): Absent  Sensory (8. ): Normal  Best Language (9. ): Mild to moderate aphasia  Dysarthria (10. ): Normal  Extinction and Inattention (11): No abnormality  Total: 2    FINAL IMPRESSION  1. Metabolic encephalopathy    2. Hyponatremia    3. Witnessed seizure-like activity ([facility] [address]    4. Hypokalemia    5.  History of constipation        Blood pressure 124/89, pulse 94, temperature 97.7 °F (36.5 °C), temperature source Oral, resp. rate 18, height 5' 4\" (1.626 m), weight 106 lb (48.1 kg), SpO2 96 %.      For further details of [facility] emergency department encounter, please see documentation by advanced practice provider, SAGE King.       Tejal Caldera MD  05/04/21 0254

## 2021-04-22 NOTE — ED NOTES
Bed: [facility]-02  Expected date:   Expected time:   Means of arrival: [facility]  Comments:     Donnie Zhou RN  04/22/21 [id number]

## 2021-04-22 NOTE — ED PROVIDER NOTES
[facility]        Pt Name: Isabel Patton  MRN: [id number]  [place] 1975  Date of evaluation: 4/22/2021  Provider: Mike Nassar PA-C  PCP: Thad Solomon     I have seen and evaluated this patient with my supervising physician Kathleen Dodd MD.    [address] [place] [address]       Chief Complaint   Patient presents with    Altered Mental Status     pt brought in by family with c/o headache and altered mental status. daughter states pt not acting herself. states dad described a seizure like activity 2 days ago        HISTORY OF PRESENT ILLNESS   (Location, Timing/Onset, Context/Setting, Quality, Duration, Modifying Factors, Severity, Associated Signs and Symptoms)  Note limiting factors. Isabel Patton is a 54 y.o. female presents the emergency department with multiple complaints that have been ongoing for more than 48 hours. It is reported that the patient had not been feeling well. Unfortunately the exact details of this that when it began 2 days ago are not immediately known. It is reported that she was on the couch when she had what was reported to be seizure-like activity. It is reported that she was shaking on the couch and did so for a number of minutes then then stopped. Daughter at bedside states that this was witnessed by the  who is not currently in attendance. She did not fall from the couch per daughter report but she was not there to confirm that. It is reported that it sounded as if she might of had a post ictal episode where she was also noted to be altered. They did not think much of it and did not contact emergency medical services for whatever reason. She is had waxing and waning symptoms that have been ongoing since that time. Her primary complaint is that that she is experiencing headache pain. Pain and discomfort of her head is global in nature and rates to be 8 out of 10.   It is reported that for abdominal pain and constipation. Negative for diarrhea, nausea and vomiting. Genitourinary: Negative for dysuria and flank pain. Musculoskeletal: Positive for back pain. Skin: Negative for rash and wound. Neurological: Positive for seizures, weakness and headaches. Negative for facial asymmetry. Psychiatric/Behavioral: Positive for confusion. Positives and Pertinent negatives as per HPI. Except as noted above in the ROS, all other systems were reviewed and negative. PAST MEDICAL HISTORY     Past Medical History:   Diagnosis Date    GERD (gastroesophageal reflux disease)     Hyperlipidemia     Hypertension          SURGICAL HISTORY   History reviewed. No pertinent surgical history. CURRENTMEDICATIONS       Previous Medications    AMITRIPTYLINE (ELAVIL) 10 MG TABLET    Take 10 mg by mouth nightly    DULOXETINE (CYMBALTA) 30 MG EXTENDED RELEASE CAPSULE    Take 30 mg by mouth daily    FLUTICASONE (FLONASE) 50 MCG/ACT NASAL SPRAY    2 sprays by Nasal route daily    HYDROCHLOROTHIAZIDE (HYDRODIURIL) 25 MG TABLET    Take 1 tablet by mouth daily    LORATADINE (CLARITIN) 10 MG CAPSULE    Take 10 mg by mouth daily    MELATONIN 3 MG TABS TABLET    Take 3 mg by mouth nightly as needed    METFORMIN (GLUCOPHAGE) 500 MG TABLET    Take 500 mg by mouth 2 times daily (with meals)    METOPROLOL SUCCINATE (TOPROL XL) 25 MG EXTENDED RELEASE TABLET    Take 25 mg by mouth daily    OMEPRAZOLE (PRILOSEC) 20 MG CAPSULE    Take 20 mg by mouth daily    ONDANSETRON (ZOFRAN) 4 MG TABLET    Take 1 tablet by mouth every 8 hours as needed for Nausea    PANTOPRAZOLE SODIUM (PROTONIX) 40 MG PACK PACKET    Take 40 mg by mouth every morning (before breakfast)    TOPIRAMATE (TOPAMAX) 100 MG TABLET    Take 100 mg by mouth 2 times daily    TRAMADOL (ULTRAM) 50 MG TABLET    Take 1 tablet by mouth every 6 hours as needed for Pain         ALLERGIES     Asa [aspirin]    FAMILYHISTORY     History reviewed.  No pertinent family history. SOCIAL HISTORY       Social History     Tobacco Use    Smoking status: Never Smoker    Smokeless tobacco: Never Used   Substance Use Topics    Alcohol use: No    Drug use: No       SCREENINGS   NIH Stroke Scale  NIH Stroke Scale Assessed: Yes  Interval: Baseline  Level of Consciousness (1a. ): Alert  LOC Questions (1b. ): Answers both correctly  LOC Commands (1c. ): Performs one task correctly  Best Gaze (2. ): Normal  Visual (3. ): No visual loss  Facial Palsy (4. ): Normal symmetrical movement  Motor Arm, Left (5a. ): No drift  Motor Arm, Right (5b. ): No drift  Motor Leg, Left (6a. ): No drift  Motor Leg, Right (6b. ): No drift  Limb Ataxia (7. ): Absent  Sensory (8. ): Normal  Best Language (9. ): Mild to moderate aphasia  Dysarthria (10. ): Normal  Extinction and Inattention (11): No abnormality  Total: 2         PHYSICAL EXAM    (up to 7 for level 4, 8 or more for level 5)     ED Triage Vitals [04/22/21 1544]   BP Temp Temp Source Pulse Resp SpO2 Height Weight   124/89 97.7 °F (36.5 °C) Oral 94 18 96 % 5' 4\" (1.626 m) 106 lb (48.1 kg)       Physical Exam  Vitals signs and nursing note reviewed. Constitutional:       General: She is awake. She is not in acute distress. Appearance: Normal appearance. She is well-developed. She is not ill-appearing or diaphoretic. HENT:      Head: Normocephalic and atraumatic. No raccoon eyes, Marcum's sign, contusion or laceration. Jaw: There is normal jaw occlusion. Right Ear: Hearing and external ear normal.      Left Ear: Hearing and external ear normal.      Nose: Nose normal.      Mouth/Throat:      Lips: Pink. Mouth: Mucous membranes are moist.      Comments: Tongue benign with no evidence of tongue biting. Oropharynx unremarkable. Eyes:      General: Lids are normal. No visual field deficit or scleral icterus. Right eye: No discharge. Left eye: No discharge.       Conjunctiva/sclera: Conjunctivae normal. Pupils: Pupils are equal, round, and reactive to light. Neck:      Musculoskeletal: Normal range of motion. Vascular: No JVD. Cardiovascular:      Rate and Rhythm: Normal rate and regular rhythm. Heart sounds: No murmur. No friction rub. No gallop. Pulmonary:      Effort: Pulmonary effort is normal. No accessory muscle usage or respiratory distress. Breath sounds: Normal breath sounds. No wheezing, rhonchi or rales. Abdominal:      General: Abdomen is flat. Bowel sounds are normal. There is no distension. Palpations: Abdomen is soft. Abdomen is not rigid. There is no mass. Tenderness: There is generalized abdominal tenderness. There is no right CVA tenderness, left CVA tenderness, guarding or rebound. Skin:     General: Skin is warm and dry. Neurological:      General: No focal deficit present. Mental Status: She is alert. She is confused. GCS: GCS eye subscore is 4. GCS verbal subscore is 4. Cranial Nerves: No cranial nerve deficit or facial asymmetry. Sensory: No sensory deficit. Motor: Weakness present. No tremor, abnormal muscle tone, seizure activity or pronator drift. Coordination: Coordination normal. Finger-Nose-Finger Test abnormal and Heel to Charolet Ania Test abnormal.      Gait: Gait normal.      Comments: Alert to person and place. Able to follow simple commands. Varying appropriate response. Psychiatric:         Behavior: Behavior normal. Behavior is cooperative.          DIAGNOSTIC RESULTS   LABS:    Labs Reviewed   CBC WITH AUTO DIFFERENTIAL - Abnormal; Notable for the following components:       Result Value    RDW 12.3 (*)     All other components within normal limits    Narrative:     Performed at:  [facility] [address], [place], [facility] [address]   Phone [phone]   COMPREHENSIVE METABOLIC PANEL W/ REFLEX TO MG FOR LOW K - Abnormal; Notable for the following components:    Sodium 123 (*) Potassium reflex Magnesium 3.1 (*)     Chloride 85 (*)     Glucose 131 (*)     CREATININE 0.5 (*)     Total Bilirubin 1.3 (*)     All other components within normal limits    Narrative:     Performed at:  [facility] [address], "[place]   Phone [phone]   LIPASE - Abnormal; Notable for the following components:    Lipase 70.0 (*)     All other components within normal limits    Narrative:     Performed at:  [facility] [address] [address], "[employer]   Phone [phone]   URINE RT REFLEX TO CULTURE - Abnormal; Notable for the following components:    Leukocyte Esterase, Urine TRACE (*)     All other components within normal limits    Narrative:     Performed at:  [facility] [address] [address], "[employer]   Phone [phone]   SALICYLATE LEVEL - Abnormal; Notable for the following components:    Salicylate, Serum <4.3 (*)     All other components within normal limits    Narrative:     Performed at:  [facility] [address], "[employer]   Phone [phone]   ACETAMINOPHEN LEVEL - Abnormal; Notable for the following components:    Acetaminophen Level <5 (*)     All other components within normal limits    Narrative:     Performed at:  [facility] [address] [address], "[employer]   Phone [phone]   BLOOD GAS, VENOUS - Abnormal; Notable for the following components:    pO2, Jose Alfredo 58.4 (*)     Base Excess, Jose Alfredo 3.3 (*)     Carboxyhemoglobin 2.3 (*)     All other components within normal limits    Narrative:     Performed at:  [facility] [address], "[employer]   Phone [phone]   SODIUM - Abnormal; Notable for the following components:    Sodium 122 (*)     All other components within normal limits    Narrative:     Performed at:  [facility]  [address], [address]   Phone [phone], RAPID    Narrative:     Performed at:  [facility]  [address], [address]   Phone [phone]   CULTURE, BLOOD 1   CULTURE, BLOOD 2   TROPONIN    Narrative:     Performed at:  [facility]  [address] [place],  [place], [address]   Phone [phone]   BRAIN NATRIURETIC PEPTIDE    Narrative:     Performed at:  [facility]  [address] [place],  [place], [address]   Phone [phone]   PROTIME-INR    Narrative:     Performed at:  [facility]  [address] [place],  [place], [address]   Phone [phone]   LACTATE, SEPSIS    Narrative:     Performed at:  [facility]  [address]  [place], [address]   Phone [phone]   URINE DRUG SCREEN    Narrative:     Performed at:  [facility]  [address]  [place], [address]   Phone [phone]   AMMONIA    Narrative:     Performed at:  [facility]  [address]  [place], [address]   Phone [phone]   MAGNESIUM    Narrative:     Performed at:  [facility]  [address]  [place], [address]   Phone [phone]   HCG, SERUM, QUALITATIVE    Narrative:     Performed at:  [facility]  [address]  [place], [address]   Phone [phone]   MICROSCOPIC URINALYSIS    Narrative:     Performed at:  [facility]  [address],  [place], [address]   Phone [phone]   OSMOLALITY, URINE   OSMOLALITY   TSH WITH REFLEX   CREATININE, RANDOM URINE   ELECTROLYTES URINE RANDOM   SODIUM   RENAL FUNCTION PANEL       All other labs were within normal range or not returned as of this dictation. EKG:  All EKG's are interpreted by the Emergency Department Physician in the absence of a cardiologist.  Please see their note for interpretation of EKG. RADIOLOGY:   Non-plain film images such as CT, Ultrasound and MRI are read by the radiologist. Plain radiographic images are visualized and preliminarily interpreted by the ED Provider with the below findings:        Interpretation per the Radiologist below, if available at the time of this note:    CT CHEST ABDOMEN PELVIS W CONTRAST   Final Result   Chest CT: No acute abnormality identified. Abdomen pelvis CT: Moderate amount of stool within the colon. Correlate with   any clinical evidence of constipation. Otherwise, no acute abnormality detected. CT Head WO Contrast   Final Result   No acute intracranial abnormality. XR CHEST PORTABLE   Final Result   No acute cardiopulmonary disease. PROCEDURES   Unless otherwise noted below, none     Procedures    CRITICAL CARE TIME   Because of the high probability of sudden clinical deterioration of the patients condition and to prevent further deterioration, my critical care time involved my full attention to the patients condition, and included chart data review, documentation, medication ordering, viewing the patients old records, reevaluation of the patient's cardiac, pulmonary, and neurological status. Reassessing vital signs. Consutlations with off service physician. Ordering, interpreting reviewing diagnostic testing. Therefore my critical care time was 44 minutes of direct attention to the patients condition and did not include time spent on procedures.       CONSULTS:  IP CONSULT TO NEPHROLOGY      EMERGENCY DEPARTMENT COURSE and DIFFERENTIAL DIAGNOSIS/MDM:   Vitals:    Vitals:    04/22/21 1544 04/22/21 1900   BP: 124/89 105/67   Pulse: 94 77   Resp: 18 16   Temp: 97.7 °F (36.5 °C)    TempSrc: Oral    SpO2: 96% 99%   Weight: 106 lb (48.1 kg)    Height: 5' 4\" (1.626 m)        Patient was given the following medications:  Medications   0.9 % sodium chloride infusion ( Intravenous New Bag 4/22/21 1833)   potassium chloride (KLOR-CON M) extended release tablet 40 mEq (40 mEq Oral Given 4/22/21 1833)   ondansetron (ZOFRAN) injection 4 mg (4 mg Intravenous Given 4/22/21 1834)   fentaNYL (SUBLIMAZE) injection 50 mcg (50 mcg Intravenous Given 4/22/21 1834)   iopamidol (ISOVUE-370) 76 % injection 75 mL (75 mLs Intravenous Given 4/22/21 1807)           The patient's detailed history of present illness is documented as above. Upon arrival to the emergency department the patient's vital signs are as documented. The patient is noted to be hemodynamically stable and afebrile. Physical examination findings are as above. IV access was obtained laboratory testing and work-up was initiated. Although the patient is altered and has some aphasia she was not stroke alerted as she is more than 48 hours removed from the onset of symptoms. EKG performed upon arrival demonstrates a sinus rhythm with a rate of 88. Normal axis and interval.  Nonspecific T wave changes. No evidence of acute ST elevation. Please see attending physician details for further EKG interpretation. CBC demonstrates no evidence of leukocytosis anemia and/or thrombocytopenia. BUN is 9 creatinine is 0.5 nonfasting glucose 131. Her initial sodium returned low at 123 which is a new finding. Hyponatremia labs were added and a telephone call was placed to nephrology because of the patient's concomitant concerns for metabolic encephalopathy. Dr. Belia Crain is on board. 75 cc of saline an hour. Chloride is 85. Potassium is low at 3.1 and this was replaced as above. Magnesium is 1.9.  CO2 at 26 with a gap of 12. Total bilirubin is 1.3 with no evidence of transaminitis or elevation of alkaline phosphatase. Troponin is less than 0.01 proBNP is 56. Coags unremarkable. Salicylate Tylenol within normal limits. Lipase 70.   Gas is not elevated at 1.1. Ammonia is 23. Venous blood gas demonstrates a normal pH without hypercapnia or hypoxemia. Rapid Covid is negative. UA demonstrates leukocytes but in reviewing the microscopic is not infected. Toxicology screening is negative. Portable chest x-ray demonstrates no evidence of acute cardiopulmonary process. CT of the head demonstrates no evidence of acute intercranial abnormality. CT chest abdomen and pelvis is completed as above and demonstrates no evidence of acute intra-abdominal or intrapelvic pathology. Patient is on a number of medications that could cause hyponatremia. Unfortunately because of her associated encephalopathy she will require ongoing care management on an inpatient basis. She likely will need an EEG to evaluate the possibility that her symptoms were related to a seizure in the etiology of that. Patient will be admitted to medical surgical services for ongoing care management the diagnoses below. FINAL IMPRESSION      1. Metabolic encephalopathy    2. Hyponatremia    3. Witnessed seizure-like activity ([facility] [address]    4. Hypokalemia    5.  History of constipation          DISPOSITION/PLAN   DISPOSITION Decision To Admit 04/22/2021 08:15:51 PM         (Please note that portions of this note were completed with a voice recognition program.  Efforts were made to edit the dictations but occasionally words are mis-transcribed.)    Dakota Matthews PA-C (electronically signed)           Jordan Aden PA-C  04/22/21 2027

## 2021-04-23 PROBLEM — E43 SEVERE MALNUTRITION (HCC): Status: ACTIVE | Noted: 2021-04-23

## 2021-04-23 LAB
ALBUMIN SERPL-MCNC: 4.4 G/DL (ref 3.4–5)
ANION GAP SERPL CALCULATED.3IONS-SCNC: 10 MMOL/L (ref 3–16)
ANION GAP SERPL CALCULATED.3IONS-SCNC: 9 MMOL/L (ref 3–16)
BUN BLDV-MCNC: 7 MG/DL (ref 7–20)
BUN BLDV-MCNC: 8 MG/DL (ref 7–20)
CALCIUM SERPL-MCNC: 9 MG/DL (ref 8.3–10.6)
CALCIUM SERPL-MCNC: 9.5 MG/DL (ref 8.3–10.6)
CHLORIDE BLD-SCNC: 92 MMOL/L (ref 99–110)
CHLORIDE BLD-SCNC: 98 MMOL/L (ref 99–110)
CO2: 24 MMOL/L (ref 21–32)
CO2: 29 MMOL/L (ref 21–32)
CREAT SERPL-MCNC: 0.6 MG/DL (ref 0.6–1.1)
CREAT SERPL-MCNC: <0.5 MG/DL (ref 0.6–1.1)
EKG ATRIAL RATE: 88 BPM
EKG DIAGNOSIS: NORMAL
EKG P AXIS: 40 DEGREES
EKG P-R INTERVAL: 156 MS
EKG Q-T INTERVAL: 370 MS
EKG QRS DURATION: 86 MS
EKG QTC CALCULATION (BAZETT): 447 MS
EKG R AXIS: 11 DEGREES
EKG T AXIS: 1 DEGREES
EKG VENTRICULAR RATE: 88 BPM
GFR AFRICAN AMERICAN: >60
GFR AFRICAN AMERICAN: >60
GFR NON-AFRICAN AMERICAN: >60
GFR NON-AFRICAN AMERICAN: >60
GLUCOSE BLD-MCNC: 111 MG/DL (ref 70–99)
GLUCOSE BLD-MCNC: 133 MG/DL (ref 70–99)
GLUCOSE BLD-MCNC: 141 MG/DL (ref 70–99)
GLUCOSE BLD-MCNC: 87 MG/DL (ref 70–99)
GLUCOSE BLD-MCNC: 94 MG/DL (ref 70–99)
GLUCOSE BLD-MCNC: 95 MG/DL (ref 70–99)
GLUCOSE BLD-MCNC: 97 MG/DL (ref 70–99)
HCT VFR BLD CALC: 40.1 % (ref 36–48)
HEMOGLOBIN: 13.8 G/DL (ref 12–16)
MCH RBC QN AUTO: 29.7 PG (ref 26–34)
MCHC RBC AUTO-ENTMCNC: 34.3 G/DL (ref 31–36)
MCV RBC AUTO: 86.6 FL (ref 80–100)
OSMOLALITY: 266 MOSM/KG (ref 275–295)
PDW BLD-RTO: 12.3 % (ref 12.4–15.4)
PERFORMED ON: ABNORMAL
PERFORMED ON: NORMAL
PERFORMED ON: NORMAL
PHOSPHORUS: 2.8 MG/DL (ref 2.5–4.9)
PLATELET # BLD: 376 K/UL (ref 135–450)
PMV BLD AUTO: 8.8 FL (ref 5–10.5)
POTASSIUM SERPL-SCNC: 2.8 MMOL/L (ref 3.5–5.1)
POTASSIUM SERPL-SCNC: 3.5 MMOL/L (ref 3.5–5.1)
RBC # BLD: 4.63 M/UL (ref 4–5.2)
SODIUM BLD-SCNC: 130 MMOL/L (ref 136–145)
SODIUM BLD-SCNC: 132 MMOL/L (ref 136–145)
SODIUM BLD-SCNC: 133 MMOL/L (ref 136–145)
T4 FREE: 6.6 NG/DL (ref 0.9–1.8)
TSH REFLEX: 0.02 UIU/ML (ref 0.27–4.2)
WBC # BLD: 8.2 K/UL (ref 4–11)

## 2021-04-23 PROCEDURE — 2580000003 HC RX 258: Performed by: PHYSICIAN ASSISTANT

## 2021-04-23 PROCEDURE — 97530 THERAPEUTIC ACTIVITIES: CPT

## 2021-04-23 PROCEDURE — 36415 COLL VENOUS BLD VENIPUNCTURE: CPT

## 2021-04-23 PROCEDURE — 97162 PT EVAL MOD COMPLEX 30 MIN: CPT

## 2021-04-23 PROCEDURE — 84295 ASSAY OF SERUM SODIUM: CPT

## 2021-04-23 PROCEDURE — 6370000000 HC RX 637 (ALT 250 FOR IP): Performed by: PHYSICIAN ASSISTANT

## 2021-04-23 PROCEDURE — 97166 OT EVAL MOD COMPLEX 45 MIN: CPT

## 2021-04-23 PROCEDURE — 97535 SELF CARE MNGMENT TRAINING: CPT

## 2021-04-23 PROCEDURE — 6360000002 HC RX W HCPCS: Performed by: INTERNAL MEDICINE

## 2021-04-23 PROCEDURE — 6360000002 HC RX W HCPCS: Performed by: PHYSICIAN ASSISTANT

## 2021-04-23 PROCEDURE — 80069 RENAL FUNCTION PANEL: CPT

## 2021-04-23 PROCEDURE — 99223 1ST HOSP IP/OBS HIGH 75: CPT | Performed by: PSYCHIATRY & NEUROLOGY

## 2021-04-23 PROCEDURE — 2060000000 HC ICU INTERMEDIATE R&B

## 2021-04-23 PROCEDURE — 93010 ELECTROCARDIOGRAM REPORT: CPT | Performed by: INTERNAL MEDICINE

## 2021-04-23 PROCEDURE — 95816 EEG AWAKE AND DROWSY: CPT

## 2021-04-23 PROCEDURE — 95816 EEG AWAKE AND DROWSY: CPT | Performed by: PSYCHIATRY & NEUROLOGY

## 2021-04-23 PROCEDURE — 6370000000 HC RX 637 (ALT 250 FOR IP): Performed by: INTERNAL MEDICINE

## 2021-04-23 PROCEDURE — 85027 COMPLETE CBC AUTOMATED: CPT

## 2021-04-23 PROCEDURE — 97116 GAIT TRAINING THERAPY: CPT

## 2021-04-23 RX ORDER — AMITRIPTYLINE HYDROCHLORIDE 10 MG/1
10 TABLET, FILM COATED ORAL NIGHTLY
Status: DISCONTINUED | OUTPATIENT
Start: 2021-04-23 | End: 2021-04-26 | Stop reason: HOSPADM

## 2021-04-23 RX ORDER — CETIRIZINE HYDROCHLORIDE 10 MG/1
10 TABLET ORAL DAILY
Status: DISCONTINUED | OUTPATIENT
Start: 2021-04-23 | End: 2021-04-26 | Stop reason: HOSPADM

## 2021-04-23 RX ORDER — ACETAMINOPHEN 650 MG/1
650 SUPPOSITORY RECTAL EVERY 6 HOURS PRN
Status: DISCONTINUED | OUTPATIENT
Start: 2021-04-23 | End: 2021-04-26 | Stop reason: HOSPADM

## 2021-04-23 RX ORDER — SODIUM CHLORIDE 9 MG/ML
25 INJECTION, SOLUTION INTRAVENOUS PRN
Status: DISCONTINUED | OUTPATIENT
Start: 2021-04-23 | End: 2021-04-26 | Stop reason: HOSPADM

## 2021-04-23 RX ORDER — INSULIN LISPRO 100 [IU]/ML
0-6 INJECTION, SOLUTION INTRAVENOUS; SUBCUTANEOUS
Status: DISCONTINUED | OUTPATIENT
Start: 2021-04-23 | End: 2021-04-26 | Stop reason: HOSPADM

## 2021-04-23 RX ORDER — ONDANSETRON 2 MG/ML
4 INJECTION INTRAMUSCULAR; INTRAVENOUS EVERY 6 HOURS PRN
Status: DISCONTINUED | OUTPATIENT
Start: 2021-04-23 | End: 2021-04-26 | Stop reason: HOSPADM

## 2021-04-23 RX ORDER — TOPIRAMATE 100 MG/1
100 TABLET, FILM COATED ORAL 2 TIMES DAILY
Status: DISCONTINUED | OUTPATIENT
Start: 2021-04-23 | End: 2021-04-26 | Stop reason: HOSPADM

## 2021-04-23 RX ORDER — ATORVASTATIN CALCIUM 20 MG/1
20 TABLET, FILM COATED ORAL DAILY
Status: DISCONTINUED | OUTPATIENT
Start: 2021-04-23 | End: 2021-04-26 | Stop reason: HOSPADM

## 2021-04-23 RX ORDER — PANTOPRAZOLE SODIUM 40 MG/1
40 TABLET, DELAYED RELEASE ORAL
Status: DISCONTINUED | OUTPATIENT
Start: 2021-04-23 | End: 2021-04-26 | Stop reason: HOSPADM

## 2021-04-23 RX ORDER — POTASSIUM CHLORIDE 20 MEQ/1
40 TABLET, EXTENDED RELEASE ORAL
Status: COMPLETED | OUTPATIENT
Start: 2021-04-23 | End: 2021-04-23

## 2021-04-23 RX ORDER — FLUTICASONE PROPIONATE 50 MCG
2 SPRAY, SUSPENSION (ML) NASAL DAILY
Status: DISCONTINUED | OUTPATIENT
Start: 2021-04-23 | End: 2021-04-26 | Stop reason: HOSPADM

## 2021-04-23 RX ORDER — NICOTINE POLACRILEX 4 MG
15 LOZENGE BUCCAL PRN
Status: DISCONTINUED | OUTPATIENT
Start: 2021-04-23 | End: 2021-04-26 | Stop reason: HOSPADM

## 2021-04-23 RX ORDER — SODIUM CHLORIDE 0.9 % (FLUSH) 0.9 %
10 SYRINGE (ML) INJECTION EVERY 12 HOURS SCHEDULED
Status: DISCONTINUED | OUTPATIENT
Start: 2021-04-23 | End: 2021-04-26 | Stop reason: HOSPADM

## 2021-04-23 RX ORDER — SODIUM CHLORIDE AND POTASSIUM CHLORIDE .9; .15 G/100ML; G/100ML
SOLUTION INTRAVENOUS CONTINUOUS
Status: DISCONTINUED | OUTPATIENT
Start: 2021-04-23 | End: 2021-04-24

## 2021-04-23 RX ORDER — DULOXETIN HYDROCHLORIDE 30 MG/1
30 CAPSULE, DELAYED RELEASE ORAL DAILY
Status: DISCONTINUED | OUTPATIENT
Start: 2021-04-23 | End: 2021-04-26 | Stop reason: HOSPADM

## 2021-04-23 RX ORDER — POTASSIUM CHLORIDE 7.45 MG/ML
20 INJECTION INTRAVENOUS ONCE
Status: COMPLETED | OUTPATIENT
Start: 2021-04-23 | End: 2021-04-23

## 2021-04-23 RX ORDER — LORAZEPAM 2 MG/ML
2 INJECTION INTRAMUSCULAR EVERY 4 HOURS PRN
Status: DISCONTINUED | OUTPATIENT
Start: 2021-04-23 | End: 2021-04-26 | Stop reason: HOSPADM

## 2021-04-23 RX ORDER — LANOLIN ALCOHOL/MO/W.PET/CERES
3 CREAM (GRAM) TOPICAL NIGHTLY PRN
Status: DISCONTINUED | OUTPATIENT
Start: 2021-04-23 | End: 2021-04-26 | Stop reason: HOSPADM

## 2021-04-23 RX ORDER — ONDANSETRON 4 MG/1
4 TABLET, FILM COATED ORAL EVERY 8 HOURS PRN
Status: DISCONTINUED | OUTPATIENT
Start: 2021-04-23 | End: 2021-04-23 | Stop reason: ALTCHOICE

## 2021-04-23 RX ORDER — INSULIN LISPRO 100 [IU]/ML
0-3 INJECTION, SOLUTION INTRAVENOUS; SUBCUTANEOUS NIGHTLY
Status: DISCONTINUED | OUTPATIENT
Start: 2021-04-23 | End: 2021-04-26 | Stop reason: HOSPADM

## 2021-04-23 RX ORDER — DEXTROSE MONOHYDRATE 25 G/50ML
12.5 INJECTION, SOLUTION INTRAVENOUS PRN
Status: DISCONTINUED | OUTPATIENT
Start: 2021-04-23 | End: 2021-04-26 | Stop reason: HOSPADM

## 2021-04-23 RX ORDER — DEXTROSE MONOHYDRATE 50 MG/ML
100 INJECTION, SOLUTION INTRAVENOUS PRN
Status: DISCONTINUED | OUTPATIENT
Start: 2021-04-23 | End: 2021-04-26 | Stop reason: HOSPADM

## 2021-04-23 RX ORDER — ACETAMINOPHEN 325 MG/1
650 TABLET ORAL EVERY 6 HOURS PRN
Status: DISCONTINUED | OUTPATIENT
Start: 2021-04-23 | End: 2021-04-26 | Stop reason: HOSPADM

## 2021-04-23 RX ORDER — HYDROCORTISONE 25 MG/G
CREAM TOPICAL 2 TIMES DAILY PRN
Status: DISCONTINUED | OUTPATIENT
Start: 2021-04-23 | End: 2021-04-26 | Stop reason: HOSPADM

## 2021-04-23 RX ORDER — METOPROLOL SUCCINATE 25 MG/1
25 TABLET, EXTENDED RELEASE ORAL DAILY
Status: DISCONTINUED | OUTPATIENT
Start: 2021-04-23 | End: 2021-04-25

## 2021-04-23 RX ORDER — SODIUM CHLORIDE 0.9 % (FLUSH) 0.9 %
10 SYRINGE (ML) INJECTION PRN
Status: DISCONTINUED | OUTPATIENT
Start: 2021-04-23 | End: 2021-04-26 | Stop reason: HOSPADM

## 2021-04-23 RX ADMIN — POTASSIUM CHLORIDE 40 MEQ: 1500 TABLET, EXTENDED RELEASE ORAL at 06:50

## 2021-04-23 RX ADMIN — POTASSIUM CHLORIDE AND SODIUM CHLORIDE: 900; 150 INJECTION, SOLUTION INTRAVENOUS at 11:58

## 2021-04-23 RX ADMIN — ENOXAPARIN SODIUM 40 MG: 40 INJECTION SUBCUTANEOUS at 10:21

## 2021-04-23 RX ADMIN — TOPIRAMATE 100 MG: 100 TABLET, FILM COATED ORAL at 01:16

## 2021-04-23 RX ADMIN — PANTOPRAZOLE SODIUM 40 MG: 40 TABLET, DELAYED RELEASE ORAL at 05:08

## 2021-04-23 RX ADMIN — ACETAMINOPHEN 650 MG: 325 TABLET ORAL at 22:29

## 2021-04-23 RX ADMIN — METOPROLOL SUCCINATE 25 MG: 25 TABLET, EXTENDED RELEASE ORAL at 10:20

## 2021-04-23 RX ADMIN — ONDANSETRON 4 MG: 2 INJECTION INTRAMUSCULAR; INTRAVENOUS at 22:31

## 2021-04-23 RX ADMIN — AMITRIPTYLINE HYDROCHLORIDE 10 MG: 10 TABLET, FILM COATED ORAL at 22:26

## 2021-04-23 RX ADMIN — DULOXETINE HYDROCHLORIDE 30 MG: 30 CAPSULE, DELAYED RELEASE ORAL at 10:20

## 2021-04-23 RX ADMIN — POTASSIUM CHLORIDE 20 MEQ: 7.46 INJECTION, SOLUTION INTRAVENOUS at 05:06

## 2021-04-23 RX ADMIN — TOPIRAMATE 100 MG: 100 TABLET, FILM COATED ORAL at 10:20

## 2021-04-23 RX ADMIN — AMITRIPTYLINE HYDROCHLORIDE 10 MG: 10 TABLET, FILM COATED ORAL at 01:16

## 2021-04-23 RX ADMIN — ACETAMINOPHEN 650 MG: 325 TABLET ORAL at 17:01

## 2021-04-23 RX ADMIN — POTASSIUM CHLORIDE 40 MEQ: 1500 TABLET, EXTENDED RELEASE ORAL at 05:02

## 2021-04-23 RX ADMIN — CETIRIZINE HYDROCHLORIDE 10 MG: 10 TABLET, FILM COATED ORAL at 10:20

## 2021-04-23 RX ADMIN — Medication 10 ML: at 10:23

## 2021-04-23 RX ADMIN — Medication 10 ML: at 22:27

## 2021-04-23 RX ADMIN — TOPIRAMATE 100 MG: 100 TABLET, FILM COATED ORAL at 22:26

## 2021-04-23 ASSESSMENT — PAIN DESCRIPTION - LOCATION
LOCATION: HEAD;ABDOMEN
LOCATION: ABDOMEN

## 2021-04-23 ASSESSMENT — PAIN SCALES - GENERAL
PAINLEVEL_OUTOF10: 8
PAINLEVEL_OUTOF10: 0
PAINLEVEL_OUTOF10: 7

## 2021-04-23 NOTE — PROGRESS NOTES
Occupational Therapy   Occupational Therapy Initial Assessment  Date: 2021   Patient Name: Jennifer Marsh  MRN: [id number]     : 1975    Date of Service: 2021    Discharge Recommendations: Jennifer Marsh scored a 16/24 on the AM-PAC ADL Inpatient form. Current research shows that an AM-PAC score of 17 or less is typically not associated with a discharge to the patient's home setting. Based on the patient's AM-PAC score and their current ADL deficits, it is recommended that the patient have 5-7 sessions per week of Occupational Therapy at d/c to increase the patient's independence. At this time, this patient demonstrates the endurance, and/or tolerance for 3 hours of therapy each day, with a treatment frequency of 5-7x/wk. Please see assessment section for further patient specific details. If patient discharges prior to next session this note will serve as a discharge summary. Please see below for the latest assessment towards goals. OT Equipment Recommendations  Equipment Needed: Yes  Mobility Devices: ADL Assistive Devices  ADL Assistive Devices: Toileting - 3-in-1 Commode; Shower Chair with back;Transfer Tub Bench;Hand-held Shower  Other: Provided handouts for DME, difference between shower chair and TTB - pt's dtr verbalized understanding    Assessment   Performance deficits / Impairments: Decreased functional mobility ; Decreased endurance;Decreased ADL status; Decreased balance;Decreased strength;Decreased high-level IADLs;Decreased coordination;Decreased sensation  Assessment: Patient presents below baseline d/t above deficits; requires significantly increased time to process commands and presents with impaired proprioception and coordination.  Would benefit from continued OT to maximize safety/independence with ADL/IADL, and decrease caregiver burden  Treatment Diagnosis: Above deficits associated with hyponatremia  Prognosis: Good  Decision Making: Medium Complexity  Exam: as incontinence but postictal state for several minutes. Subjective   General  Chart Reviewed: Yes  Patient assessed for rehabilitation services?: Yes  Diagnosis: Hyponatremia  Subjective  Subjective: Patient supine in bed upon arrival, sleeping. Dtr present and available to translate per pt preference - pt agreeable to evaluation.  RN aware of pt's pain level     Social/Functional History  Social/Functional History  Lives With: Family(dtr, spouse, 2x sons)  Type of Home: Apartment  Home Layout: One level  Home Access: (3 flights of steps to enter apt (3rd level))  Bathroom Shower/Tub: Tub/Shower unit  Bathroom Toilet: Standard  Bathroom Accessibility: Walker accessible  Home Equipment: Rolling walker  ADL Assistance: Independent(dtr reports is difficult for pt but able to complete independently - limited standing tolerance for showers)  Homemaking Assistance: Needs assistance  Ambulation Assistance: Needs assistance(will reach out for furniture or family members during ambulation, has used walker outside)  Transfer Assistance: Independent(with increased time, uses environment to grab onto for support)  Active : No  Patient's  Info: family transports pt  Additional Comments: no falls in last 6 months       Objective   Vision: Impaired(does not wear)  Vision Exceptions: Wears glasses for reading  Hearing: Within functional limits    Orientation  Overall Orientation Status: (difficult to assess d/t language barrier)  Observation/Palpation  Posture: Fair  Balance  Sitting Balance: Stand by assistance  Standing Balance: Contact guard assistance  Standing Balance  Time: ~2-3 min  Activity: ambulation, transfers  Functional Mobility  Functional - Mobility Device: Rolling Walker  Activity: To/from bathroom  Assist Level: Contact guard assistance  Functional Mobility Comments: slow re and initiation, requires cuing for maneuvering (R) side of RW  Toilet Transfers  Toilet - Technique: Ambulating  Equipment

## 2021-04-23 NOTE — PROGRESS NOTES
Admission complete, meds given www. Pt admitted from home with family. They noticed she's had AMS and weakness since a witnessed seizure a few days ago. Pt also has complaints of a headache, and has been unable to eat much since getting Covid in November due to altered smell/taste. Has lost 30lbs since. Also has hyponatremia and hypokalemia. Pt very weak, PT/OT consulted. Nephrology and Dietician also consulted. Pt is A&OP x3, alert to room and call light. Daughter at bedside. Pt is Yakut speaking and requires the  service. VSS. No other concerns at this time, will continue to monitor.

## 2021-04-23 NOTE — PROGRESS NOTES
[facility]    4/23/2021 9:34 AM        Name: Pari Bradshaw . Admitted: 4/22/2021  Primary Care Provider: Irina Rodriguez (Tel: [phone])                        Subjective:  . No acute events overnight. Resting well. Pain control. Diet ok. Labs reviewed  Denies any chest pain sob.      Reviewed interval ancillary notes    Current Medications  amitriptyline (ELAVIL) tablet 10 mg, Nightly  DULoxetine (CYMBALTA) extended release capsule 30 mg, Daily  fluticasone (FLONASE) 50 MCG/ACT nasal spray 2 spray, Daily  cetirizine (ZYRTEC) tablet 10 mg, Daily  melatonin tablet 3 mg, Nightly PRN  metoprolol succinate (TOPROL XL) extended release tablet 25 mg, Daily  pantoprazole (PROTONIX) tablet 40 mg, QAM AC  topiramate (TOPAMAX) tablet 100 mg, BID  sodium chloride flush 0.9 % injection 10 mL, 2 times per day  sodium chloride flush 0.9 % injection 10 mL, PRN  0.9 % sodium chloride infusion, PRN  enoxaparin (LOVENOX) injection 40 mg, Daily  magnesium hydroxide (MILK OF MAGNESIA) 400 MG/5ML suspension 30 mL, Daily PRN  acetaminophen (TYLENOL) tablet 650 mg, Q6H PRN    Or  acetaminophen (TYLENOL) suppository 650 mg, Q6H PRN  ondansetron (ZOFRAN) injection 4 mg, Q6H PRN  insulin lispro (1 Unit Dial) 0-6 Units, TID WC  insulin lispro (1 Unit Dial) 0-3 Units, Nightly  glucose (GLUTOSE) 40 % oral gel 15 g, PRN  dextrose 50 % IV solution, PRN  glucagon (rDNA) injection 1 mg, PRN  dextrose 5 % solution, PRN  LORazepam (ATIVAN) injection 2 mg, Q4H PRN  0.9% NaCl with KCl 20 mEq infusion, Continuous        Objective:  BP (!) 89/58   Pulse 82   Temp 98 °F (36.7 °C) (Oral)   Resp 16   Ht 5' 2\" (1.575 m)   Wt 106 lb 9.6 oz (48.4 kg)   SpO2 100%   BMI 19.50 kg/m²     Intake/Output Summary (Last 24 hours) at 4/23/2021 0934  Last data filed at 4/23/2021 0708  Gross per 24 hour Intake 1169.74 ml   Output --   Net 1169.74 ml      Wt Readings from Last 3 Encounters:   04/23/21 106 lb 9.6 oz (48.4 kg)   02/14/21 122 lb (55.3 kg)   11/18/20 135 lb (61.2 kg)       General appearance:  Appears comfortable  Eyes: Sclera clear. Pupils equal.  ENT: Moist oral mucosa. Trachea midline, no adenopathy. Cardiovascular: Regular rhythm, normal S1, S2. No murmur. No edema in lower extremities  Respiratory: Not using accessory muscles. Good inspiratory effort. Clear to auscultation bilaterally, no wheeze or crackles. GI: Abdomen soft, no tenderness, not distended, normal bowel sounds  Musculoskeletal: No cyanosis in digits, neck supple  Neurology: CN 2-12 grossly intact. No speech or motor deficits  Psych: Normal affect. Alert and oriented in time, place and person  Skin: Warm, dry, normal turgor    Labs and Tests:  CBC:   Recent Labs     04/22/21  1550 04/23/21  0123   WBC 8.3 8.2   HGB 14.3 13.8    376     BMP:    Recent Labs     04/22/21  1550 04/22/21  1550 04/23/21  0123 04/23/21  0706 04/23/21  0857   *   < > 130* 133* 132*   K 3.1*  --  2.8*  --  3.5   CL 85*  --  92*  --  98*   CO2 26  --  29  --  24   BUN 9  --  8  --  7   CREATININE 0.5*  --  0.6  --  <0.5*   GLUCOSE 131*  --  97  --  95    < > = values in this interval not displayed. Hepatic:   Recent Labs     04/22/21  1550   AST 25   ALT 24   BILITOT 1.3*   ALKPHOS 91       Discussed care with family and patient             Spent 30  minutes with patient and family at bedside and on unit reviewing medical records and labs, spent greater than 50% time counseling patient and family on diagnosis and plan   Problem List  Active Problems:    Hyponatremia  Resolved Problems:    * No resolved hospital problems. *       Assessment & Plan:   1.  Hyponatremia  -Continue to monitor serum was 122  -Has corrected to 132 today.  -Regimen per nephrology    Seizure-like activity  -Probably secondary to above.  -Ativan as needed for

## 2021-04-23 NOTE — ED NOTES
PT report called to [facility], RN at this time. She states no further questions or concerns.      Teri Crabtree RN  04/22/21 5424

## 2021-04-23 NOTE — PROGRESS NOTES
Pt seen in  ED, admission completed. Pt is alert and oriented x 2. Pt lives at home with her family, and is being admitted for metabolic encephalopathy, seizure activity, hyponatremia and hypokalemia. Plan of care updated, all questions answered.

## 2021-04-23 NOTE — PROGRESS NOTES
Physical Therapy    Facility/Department: [address]  Initial Assessment    NAME: Michoacano Rain  : 1975  MRN: [id number]    Date of Service: 2021    Discharge Recommendations: Michoacano Rain scored a 14/24 on the AM-PAC short mobility form. Current research shows that an AM-PAC score of 17 or less is typically not associated with a discharge to the patient's home setting. Based on the patient's AM-PAC score and their current functional mobility deficits, it is recommended that the patient have 5-7 sessions per week of Physical Therapy at d/c to increase the patient's independence. At this time, this patient demonstrates the endurance, and/or tolerance for 3 hours of therapy each day, with a treatment frequency of 5-7x/wk. Please see assessment section for further patient specific details. If patient discharges prior to next session this note will serve as a discharge summary. Please see below for the latest assessment towards goals. PT Equipment Recommendations  Equipment Needed: Yes  Mobility Devices: [place]; ADL AssistiveDevices  Walker: Rolling  ADL Assistive Devices: Transfer Tub Bench;Hand-held Shower    Assessment   Body structures, Functions, Activity limitations: Decreased functional mobility ; Decreased sensation;Decreased ADL status; Decreased balance;Decreased posture;Decreased strength;Decreased endurance;Decreased coordination  Assessment: Pt arrives to ED with complaints of a headache, altered mental status, and seizure like activity. Pt presents with decreased functional mobility and daughter notes that she has been on a steady decline in status over the last few months. Pt requires max increased time to initiate all movements, but with time, is able to complete STS transfers from bed and commode with CGA. Pt requires mod A for bed mobility for LE placement.  Pt reports that she is dizzy during the entire session, that is unchanged by position or rest. Pt's presents below baseline, requiring increased assistance at home to complete daily tasks, and has been unable to assist with cooking/cleaning. Pt would benefit from skilled PT services to assess abnormal movement patterns, increased strength and endurance and return to previous level of function. Prognosis: Good  Decision Making: Medium Complexity  PT Education: Goals; Functional Mobility Training;PT Role;Family Education;Equipment;Transfer Training;Energy Conservation;Gait Training  Barriers to Learning: Language barier  REQUIRES PT FOLLOW UP: Yes  Activity Tolerance  Activity Tolerance: Patient limited by endurance; Patient limited by fatigue       Patient Diagnosis(es): The primary encounter diagnosis was Metabolic encephalopathy. Diagnoses of Hyponatremia, Witnessed seizure-like activity ([facility] [address] Hypokalemia, and History of constipation were also pertinent to this visit. has a past medical history of Arthritis, Depression, Diabetes mellitus ([address] Fibromyalgia, GERD (gastroesophageal reflux disease), Hemorrhoid, Hyperlipidemia, Hypertension, Lower back pain, Migraine, and Thyroid disease. has a past surgical history that includes back surgery and Abdomen surgery. Restrictions  Restrictions/Precautions  Restrictions/Precautions: Fall Risk(high fall risk)  Required Braces or Orthoses?: No  Position Activity Restriction  Other position/activity restrictions: 54y.o.  years old [place] speaking female with history of depression, diabetes and fibromyalgia who was admitted to the hospital yesterday with a witnessed seizure. Symptoms started 2 days ago. She was asleep when she suddenly had generalized body shaking with tonic-clonic activity for few minutes. Degree was severe and duration was minutes. No tongue biting or bladder incontinence but postictal state for several minutes.   Vision/Hearing  Vision: Impaired(does not wear)  Vision Exceptions: Wears glasses for reading  Hearing: Within functional limits Subjective  General  Chart Reviewed: Yes  Patient assessed for rehabilitation services?: Yes  Response To Previous Treatment: Not applicable  Family / Caregiver Present: Yes(Daughter)  Follows Commands: Within Functional Limits  Pain Screening  Patient Currently in Pain: Yes  Vital Signs  Orthostatic B/P and Pulse?: Yes  Blood Pressure Sittin/61  Pulse Sittin PER MINUTE  Blood Pressure Standin/64  Pulse Standin PER MINUTE  Orthostatic B/P and Pulse?: Yes  Pre Treatment Pain Screening  Intervention List: Nurse/physician notified    Orientation  Orientation  Overall Orientation Status: Within Functional Limits(Difficult to assess due to language barrier)  Social/Functional History  Social/Functional History  Lives With: Family(dtr, spouse, 2x sons)  Type of Home: Apartment  Home Layout: One level  Home Access: (3 flights of steps to enter apt (3rd level))  Bathroom Shower/Tub: Tub/Shower unit  Bathroom Toilet: Standard  Bathroom Accessibility: Walker accessible  Home Equipment: Rolling walker  ADL Assistance: Independent(dtr reports is difficult for pt but able to complete independently - limited standing tolerance for showers)  Homemaking Assistance: Needs assistance  Ambulation Assistance: Needs assistance(will reach out for furniture or family members during ambulation, has used walker outside)  Transfer Assistance: Independent(with increased time, uses environment to grab onto for support)  Active : No  Patient's  Info: family transports pt  Additional Comments: no falls in last 6 months  Cognition   Cognition  Overall Cognitive Status: Exceptions  Arousal/Alertness: Delayed responses to stimuli  Following Commands:  Follows multistep commands with increased time  Attention Span: Attends with cues to redirect  Initiation: Requires cues for some  Sequencing: Requires cues for some    Objective     Observation/Palpation  Posture: Fair    AROM RLE (degrees)  RLE AROM: WFL  AROM LLE (degrees)  LLE AROM : WFL  Strength RLE  Comment: Pt 4-/5 grossly bilaterally. Strength LLE  Comment: Pt 4-/5 grossly bilaterally. Tone RLE  RLE Tone: Normotonic  Tone Description: Pt demonstrates normal tone at rest and with normal tasks like bed mobility, transfers, ambulation. When asked to complete coordination tasks, pt demonstrates uncoordinated responses (ataxic LLE, slight extensor tone on R LE)  Tone LLE  LLE Tone: Normotonic  Motor Control  Gross Motor?: Exceptions  Comments: Pt delayed with reponses and initiation of all movements. Requires increased time to begin and complete tasks. Coordination  Rapid Alternating Movements: (Pt can alternate toes on floor, but at a very slow pace)  Finger to Nose: Dysmetric(Able to complete with eyes open on L UE, but requires max increased time; Unable to complete on R UE d/t increased rapid finger extension of 2-3 digits.)  Heel to Alvarez: Ataxic(ataxic on L LE; able to bring R heel to L shin but unable to complete motion down L shin with reports of pain when attempting.)  Sensation  Overall Sensation Status: Impaired(Pt reports impaired R foot > L foot in S1 distribution)  Bed mobility  Supine to Sit: Moderate assistance(Pt receives assist from daughter)  Scooting: Contact guard assistance  Transfers  Sit to Stand: Contact guard assistance  Stand to sit: Contact guard assistance  Comment: Perfroms STS transfer to RW with CGA and increased time to initiate and follow through with motion  Ambulation  Ambulation?: Yes  Ambulation 1  Surface: level tile  Device: Rolling Walker  Assistance: Contact guard assistance  Gait Deviations: Slow Libby; Shuffles;Decreased step length;Decreased step height  Distance: 5ft  Comments: Pt ambulates at an extremely slow pace, but is able to do so with CGA. Pt notes that she is dizzy through out the session and it does not change with position changes. Orthostatics taken.   Stairs/Curb  Stairs?: No     Balance  Posture: Fair  Sitting - Static: Fair;+  Sitting - Dynamic: Fair;+  Standing - Static: Fair;+  Standing - Dynamic: (Not fully assessed d/t pt dizziness.)        Plan   Plan  Times per week: 3-5x/week  Times per day: Daily  Current Treatment Recommendations: Strengthening, ROM, Endurance Training, Balance Training, Functional Mobility Training, Gait Training, Stair training, Transfer Training, Neuromuscular Re-education  Safety Devices  Type of devices: All fall risk precautions in place, Bed alarm in place, Call light within reach, Gait belt, Patient at risk for falls, Left in bed, Nurse notified    G-Code  OutComes Score  AM-PAC Score  AM-PAC Inpatient Mobility Raw Score : 14 (04/23/21 1535)  AM-PAC Inpatient T-Scale Score : 38.1 (04/23/21 1535)  Mobility Inpatient CMS 0-100% Score: 61.29 (04/23/21 1535)  Mobility Inpatient CMS G-Code Modifier : CL (04/23/21 1535)    Goals  Short term goals  Time Frame for Short term goals: To be met at discharge. Short term goal 1: Pt will complete bed mobility with CGA  Short term goal 2: Pt will complete sit to stand/stand to sit transfers with mod I  Short term goal 3: Pt will ambulate 300 ft with LRAD and mod I  Short term goal 4: Pt will ascend/descend 14 steps with CGA       Therapy Time   Individual Concurrent Group Co-treatment   Time In 1033         Time Out 1142         Minutes 69         Timed Code Treatment Minutes: 47 Minutes     Philip Mealing, SPT  PT providing direct supervision during session and assisting in making skilled judgements throughout session.   Jose Soto, PT, DPT, [id number]  Jose Soto, PT

## 2021-04-23 NOTE — PROGRESS NOTES
Comprehensive Nutrition Assessment    Type and Reason for Visit:  Initial, Consult    Nutrition Recommendations/Plan:   1. Vegetarian diet  2. Encourage po and small meals throughout the day    Nutrition Assessment:  Pt is nutritionally compromised d/t poor intake and wt loss. Per family pt has not been eating well for the past 1-2 months. During this time pt has lost 15lbs (-12% change in wt). Pt has been experiencing some nausea and constipation with altered taste and smell. Pt also is followed by GI on an outpt basis. Pt is allergic to milk per daughter. All of our supplements in the hospital have milk in them. RD unable to order a supplement. RD suggested pt eat small amounts of food at a time (mini meals 5-6 x a day). RD will continue to monitor po. Malnutrition Assessment:  Malnutrition Status:  Severe malnutrition    Context:  Chronic Illness     Findings of the 6 clinical characteristics of malnutrition:  Energy Intake:  7 - 75% or less estimated energy requirements for 1 month or longer  Weight Loss:  7 - 5% over 1 month     Body Fat Loss:  Unable to assess(Pt in bathroom at time of visit)     Muscle Mass Loss:  Unable to assess    Fluid Accumulation:  No significant fluid accumulation     Strength:  Not Performed    Estimated Daily Nutrient Needs:  Energy (kcal):  5576-2529 cals (25-30 cals/48kg); Weight Used for Energy Requirements:  Current     Protein (g):  58-72 gms (1.2-1.5gms/48kg); Weight Used for Protein Requirements:  Current        Fluid (ml/day):   ; Method Used for Fluid Requirements:  1 ml/kcal      Nutrition Related Findings:  I/O's: +1.1L. No edema      Wounds:  None       Current Nutrition Therapies:    DIET GENERAL; Anthropometric Measures:  · Height: 5' 2\" (157.5 cm)  · Current Body Weight: 106 lb (48.1 kg)    · Usual Body Weight: 121 lb (54.9 kg)     · Ideal Body Weight: 110 lbs; % Ideal Body Weight     · BMI: 19.4  · BMI Categories: Normal Weight (BMI 18.5-24. 9) Nutrition Diagnosis:   · Severe malnutrition related to inadequate protein-energy intake as evidenced by weight loss greater than or equal to 5% in 1 month, constipation, nausea, poor intake prior to admission      Nutrition Interventions:   Food and/or Nutrient Delivery:  Modify Current Diet  Nutrition Education/Counseling:  Education not indicated   Coordination of Nutrition Care:  Continue to monitor while inpatient    Goals:  PO 50% at meals       Nutrition Monitoring and Evaluation:   Behavioral-Environmental Outcomes:  None Identified   Food/Nutrient Intake Outcomes:  Food and Nutrient Intake  Physical Signs/Symptoms Outcomes:  Weight, Nutrition Focused Physical Findings(Pt was in bathroom at time of visit)     Discharge Planning:     Too soon to determine     Electronically signed by Stephanie Barrett RD, [address] , LD on 4/23/21 at 3:08 PM EDT    Contact: [phone]

## 2021-04-23 NOTE — H&P
[facility] Medicine History & Physical      Patient Name: Bailey Rocha    : 1975    PCP: Jong Grewal    Date of Service:  Patient seen and examined on 2021     Chief Complaint:      History Of Present Illness:    Bailey Rocha is a 54 y.o. female who presented to ED with complaint of witnessed seizure like activity two days ago. Patient is Thai speaking. Daughter at bedside translating per patient preference. Patient does not have any recollection of events. Daughter is not completely sure what happened since she wasn't there but recounts what she was told by her brother and father. Daughter reports patient was on the cough when she started shaking. The episode lasted for a couple minutes before stopping and she was subsequently confused. Patient did not fall from the couch. Confusion abated but patient did have what the daughter believes was hallucinations the rest of the day. Daughter reports patient when patient woke up the next day, she was no longer confused or hallucinating but she has seemed clumsy/weak. She is ambulating on her own but won't do so unless she is holding onto something or using the wall as a support. Daughter reports they have been trying to get patient to come in for the last 2 days but she has declined because she was afraid her family would not be able to visit. Daughter reports patient has had ongoing issues with weight loss, decreased appetite, nausea, and constipation. She has been evaluated by GI with negative workup and symptoms are felt to functional in nature. Patient also reports a headache and has a history of migraines for which she takes topamax. Patient denies fever, chest pain, shortness of breath, cough, edema, abdominal pain, vomiting, diarrhea, urinary symptoms. She denies neck pain/stiffness, changes in vision, difficulty swallowing, numbness/tingling in extremities, focal weakness.       Past Medical History:    Patient has a past medical history of Arthritis, Depression, Diabetes mellitus ([facility] [address] Fibromyalgia, GERD (gastroesophageal reflux disease), Hemorrhoid, Hyperlipidemia, Hypertension, Lower back pain, Migraine, and Thyroid disease. Past Surgical History:    Patient has a past surgical history that includes back surgery and Abdomen surgery. Medications Prior to Admission:      Prior to Admission medications    Medication Sig Start Date End Date Taking?  Authorizing Provider   pantoprazole sodium (PROTONIX) 40 MG PACK packet Take 40 mg by mouth every morning (before breakfast)    Historical Provider, MD   DULoxetine (CYMBALTA) 30 MG extended release capsule Take 30 mg by mouth daily    Historical Provider, MD   metoprolol succinate (TOPROL XL) 25 MG extended release tablet Take 25 mg by mouth daily    Historical Provider, MD   hydroCHLOROthiazide (HYDRODIURIL) 25 MG tablet Take 1 tablet by mouth daily 11/19/20   Larry Inman MD   amitriptyline (ELAVIL) 10 MG tablet Take 10 mg by mouth nightly    Historical Provider, MD   melatonin 3 MG TABS tablet Take 3 mg by mouth nightly as needed    Historical Provider, MD   topiramate (TOPAMAX) 100 MG tablet Take 100 mg by mouth 2 times daily    Historical Provider, MD   loratadine (CLARITIN) 10 MG capsule Take 10 mg by mouth daily    Historical Provider, MD   metFORMIN (GLUCOPHAGE) 500 MG tablet Take 500 mg by mouth 2 times daily (with meals)    Historical Provider, MD   fluticasone (FLONASE) 50 MCG/ACT nasal spray 2 sprays by Nasal route daily 2/5/20   Marlene Rivero DO   omeprazole (PRILOSEC) 20 MG capsule Take 20 mg by mouth daily    Historical Provider, MD   traMADol Marcene Seals) 50 MG tablet Take 1 tablet by mouth every 6 hours as needed for Pain 10/11/15   Lucy Nichols MD   ondansetron [facility]) 4 MG tablet Take 1 tablet by mouth every 8 hours as needed for Nausea 10/11/15   Lucy Nichols MD       Allergies:  Marlane Chinquapin [aspirin]    Social History:      TOBACCO:   reports that she has never smoked. She has never used smokeless tobacco.  ETOH:   reports no history of alcohol use. DRUGS:  reports no history of drug use. Family History:      Reviewed in detail positive as follows:    History reviewed. No pertinent family history. REVIEW OF SYSTEMS:   Pertinent positives as noted in the HPI. All other systems reviewed and negative. PHYSICAL EXAM PERFORMED:    /67   Pulse 77   Temp 97.7 °F (36.5 °C) (Oral)   Resp 16   Ht 5' 4\" (1.626 m)   Wt 106 lb (48.1 kg)   SpO2 99%   BMI 18.19 kg/m²     General appearance:  Awake, alert, no apparent distress  HEENT:  Normocephalic, atraumatic without obvious deformity. PERRL. EOM intact. Conjunctivae/corneas clear. Neck: Supple, with full range of motion. No JVD. Trachea midline. Respiratory:  Clear to auscultation bilaterally without rales, wheezes, or rhonchi. Normal respiratory effort. Cardiovascular:  Regular rate and rhythm without murmurs, rubs or gallops. Abdomen: Soft, NT, ND, without rebound or guarding. Normal bowel sounds. Extremities:  No clubbing, cyanosis, or edema bilaterally. Full range of motion without deformity. +2 palpable pulses, equal bilaterally. Capillary refill brisk,< 3 seconds   Skin: No rashes or lesions. Warm/dry. Neurologic:  Generalized weakness present. No focal weakness. Neurovascularly intact without any focal sensory/motor deficits. Cranial nerves: II-XII intact, grossly non-focal. Alert and oriented. No facial droop. No slurred speech. Able to follow commands. Psychiatric:  Thought content appropriate, normal insight.     Labs:   CBC   Recent Labs     04/22/21  1550   WBC 8.3   HGB 14.3   HCT 41.3           RENAL  Recent Labs     04/22/21  1550 04/22/21  1942   * 122*   K 3.1*  --    CL 85*  --    CO2 26  --    BUN 9  --    CREATININE 0.5*  --        LFTS  Recent Labs     04/22/21  1550   AST 25   ALT 24   BILITOT 1.3*   ALKPHOS 91       COAG  Recent Labs     04/22/21  1550   INR 0.99 CARDIAC ENZYMES  Recent Labs     04/22/21  1550   TROPONINI <0.01       LIPIDS  No results found for: CHOL, TRIG, HDL, [address]      Radiology:     CT CHEST ABDOMEN PELVIS W CONTRAST   Final Result   Chest CT: No acute abnormality identified. Abdomen pelvis CT: Moderate amount of stool within the colon. Correlate with   any clinical evidence of constipation. Otherwise, no acute abnormality detected. CT Head WO Contrast   Final Result   No acute intracranial abnormality. XR CHEST PORTABLE   Final Result   No acute cardiopulmonary disease. ASSESSMENT/PLAN:    Hyponatremia  - Severe, Na 122  - Nephrology consulted in ED and orders were placed per nephrology recommendations    Witnessed seizure-like activity  - Likely secondary to above  - Seizure precautions  - Ativan PRN    Acute metabolic encephalopathy  - Initially with confusion after seizure-like activity - likely postictal  - Evolved to hallucinations later that day - likely due to hyponatremia  - Per daughter, these have resolved apart from generalized weakness vs clumsiness - likely due to hyponatremia  - CT head negative for acute process  - Will monitor and provide supportive care  - If symptoms reoccur/persist despite correction of sodium, would consider neurology consult    Hypokalemia  - Replace PRN and recheck    DM2  - Last HbA1c was 7.3  - Hold home PO meds   - Accuchecks, SSI  - Hypoglycemia protocol      HTN  - Continue home metoprolol  - Hold home HCTZ      DVT prophylaxis: Lovenox  Probiotic if on abx: N/A    Diet: DIET GENERAL;  Code Status: Full Code    Consults:  IP CONSULT TO NEPHROLOGY    Disposition: Admit to Inpatient   ELOS: Greater than two midnights due to medical therapy     Crista Montague PA-C    Thank you Beatris Romero for the opportunity to be involved in this patient's care. If you have any questions or concerns please feel free to contact me at [phone].

## 2021-04-23 NOTE — CONSULTS
Allergen Reactions    Asa [Aspirin] Nausea Only     Current Facility-Administered Medications   Medication Dose Route Frequency Provider Last Rate Last Admin    amitriptyline (ELAVIL) tablet 10 mg  10 mg Oral Nightly Valentina Madden PA-C   10 mg at 04/23/21 0116    DULoxetine (CYMBALTA) extended release capsule 30 mg  30 mg Oral Daily Valentina Madden PA-C   30 mg at 04/23/21 1020    fluticasone (FLONASE) 50 MCG/ACT nasal spray 2 spray  2 spray Nasal Daily Valentina Madden PA-C        cetirizine (ZYRTEC) tablet 10 mg  10 mg Oral Daily Valentina Madden PA-C   10 mg at 04/23/21 1020    melatonin tablet 3 mg  3 mg Oral Nightly PRN Valentina Madden PA-C        metoprolol succinate (TOPROL XL) extended release tablet 25 mg  25 mg Oral Daily Valentina Madden PA-C   25 mg at 04/23/21 1020    pantoprazole (PROTONIX) tablet 40 mg  40 mg Oral QAM AC Valentina Madden PA-C   40 mg at 04/23/21 3831    topiramate (TOPAMAX) tablet 100 mg  100 mg Oral BID Valentina Madden PA-C   100 mg at 04/23/21 1020    sodium chloride flush 0.9 % injection 10 mL  10 mL Intravenous 2 times per day Valentina Madden PA-C   10 mL at 04/23/21 1023    sodium chloride flush 0.9 % injection 10 mL  10 mL Intravenous PRN Valentina Madden PA-C        0.9 % sodium chloride infusion  25 mL Intravenous PRN Valentina Madden PA-C        enoxaparin (LOVENOX) injection 40 mg  40 mg Subcutaneous Daily Valentina Madden PA-C   40 mg at 04/23/21 1021    magnesium hydroxide (MILK OF MAGNESIA) 400 MG/5ML suspension 30 mL  30 mL Oral Daily PRN Valentina Madden PA-C        acetaminophen (TYLENOL) tablet 650 mg  650 mg Oral Q6H PRN Valentina Madden PA-C        Or    acetaminophen (TYLENOL) suppository 650 mg  650 mg Rectal Q6H PRN Valentina Madden PA-C        ondansetron [facility]) injection 4 mg  4 mg Intravenous Q6H PRN Valentina Madden PA-C        insulin lispro (1 Unit Dial) 0-6 Units  0-6 Units Subcutaneous TID WC Arnold Newsome PA-C        insulin lispro (1 Unit Dial) 0-3 Units  0-3 Units Subcutaneous Nightly Arnold Newsome PA-C   Stopped at 04/23/21 0117    glucose (GLUTOSE) 40 % oral gel 15 g  15 g Oral PRN Arnold Newsome PA-C        dextrose 50 % IV solution  12.5 g Intravenous PRN Arnold Newsome PA-C        glucagon (rDNA) injection 1 mg  1 mg Intramuscular PRN Arnold Newsome PA-C        dextrose 5 % solution  100 mL/hr Intravenous PRN Arnold Newsome PA-C        LORazepam (ATIVAN) injection 2 mg  2 mg Intravenous Q4H PRN Arnold Newsome PA-C        0.9% NaCl with KCl 20 mEq infusion   Intravenous Continuous Osvaldo Nieves MD 75 mL/hr at 04/23/21 1158 New Bag at 04/23/21 1158    atorvastatin (LIPITOR) tablet 20 mg  20 mg Oral Daily Mariano Guillen MD           ROS : A 10-14 system review of constitutional, cardiovascular, respiratory, eyes, musculoskeletal, endocrine, GI, ENT, skin, hematological, genitourinary, psychiatric and neurologic systems was obtained and updated today and is unremarkable except as mentioned in my HPI      Exam:     Constitutional:   Vitals:    04/23/21 0050 04/23/21 0456 04/23/21 0930 04/23/21 1145   BP: 99/66 (!) 89/58 107/74 93/65   Pulse: 78 82 89 81   Resp: 16 16 16 16   Temp: 97.7 °F (36.5 °C) 98 °F (36.7 °C) 97.9 °F (36.6 °C) 97.8 °F (36.6 °C)   TempSrc: Oral Oral Oral Oral   SpO2: 100% 100% 100% 99%   Weight: 106 lb 9.6 oz (48.4 kg)      Height: 5' 2\" (1.575 m)          General appearance:  Normal development and appear in no acute distress. Eye:  Fundus of the eye: Funduscopic examination cannot be performed due to COVID19 restrictions and precautions. Neck: supple  Cardiovascular: No lower leg edema with good pulsation. Mental Status:   Oriented to person, place, problem, and time. Memory: Good immediate recall. Intact remote memory  Normal attention span and concentration.   Language: intact naming, repeating and fluency   Good fund of Knowledge. Aware of current events and vocabulary   Cranial Nerves:   II: Visual fields: Full. Pupils: equal, round, reactive to light  III,IV,VI: Extra Ocular Movements are intact. No nystagmus  V: Facial sensation is intact   VII: Facial strength and movements: intact and symmetric  VIII: Hearing: Intact  IX: Palate elevation is symmetric  XI: Shoulder shrug is intact  XII: Tongue movements are normal  Musculoskeletal: 5/5 in all 4 extremities. Tone: Normal tone. Reflexes: 2+ in the upper extremity and 2+ in the lower extremity   Planters: flexor bilaterally. Coordination: no pronator drift, no dysmetria with FNF in upper extremities. Normal REM. Sensation: normal to all modalities in both arms and legs. Gait/Posture: steady gait    Data:  LABS:   Lab Results   Component Value Date     04/23/2021    K 3.5 04/23/2021    K 3.1 04/22/2021    CL 98 04/23/2021    CO2 24 04/23/2021    BUN 7 04/23/2021    CREATININE <0.5 04/23/2021    GFRAA >60 04/23/2021    LABGLOM >60 04/23/2021    GLUCOSE 95 04/23/2021    PHOS 2.8 04/23/2021    MG 1.90 04/22/2021    CALCIUM 9.0 04/23/2021     Lab Results   Component Value Date    WBC 8.2 04/23/2021    RBC 4.63 04/23/2021    HGB 13.8 04/23/2021    HCT 40.1 04/23/2021    MCV 86.6 04/23/2021    RDW 12.3 04/23/2021     04/23/2021     Lab Results   Component Value Date    INR 0.99 04/22/2021    PROTIME 11.5 04/22/2021       Neuroimaging were independently reviewed by me and discussed results with the patient and her daughter  Reviewed notes from different physicians  Reviewed lab and blood testing    Impression:  Acute encephalopathy and new onset seizure episode, so far cryptogenic. Will need MRI brain and EEG to rule out new structural lesion or underlying epilepsy. Hyponatremia.   I am not sure if it can explain her seizure  Chronic migraine with aura  Hypertension  Diabetes, not controlled  Hyperlipidemia    Recommendation:  MRI brain with contrast  EEG  Seizure

## 2021-04-23 NOTE — CARE COORDINATION
Discharge Planning Assessment    RN/SW discharge planner met with patient/ (and family member) to discuss reason for admission, current living situation, and potential needs at the time of discharge    Demographics/Insurance verified Yes/ [employer]    Current type of dwelling: level entry to 3rd floor apartment    Living arrangements: spouse, daughter, 2 sons    Level of function/Support: Japanese speaking, daughter  Slow, needs assistance  with low endurance     PCP: Davian Lugo    DME: rolling walker    Active with any community resources/agencies/skilled home care:  Daughter is a [employer] caregiver for 2 hours per day. Medication compliance issues: no    Financial issues that could impact healthcare: no    Transportation at the time of discharge: family  Notified MD via [facility], PT/OT recommending PMR consult for ARU, 14/24 on the AM-PAC  If patient cannot go to ARU, then she and her daughter prefer to return home with home care.

## 2021-04-24 ENCOUNTER — APPOINTMENT (OUTPATIENT)
Dept: MRI IMAGING | Age: 46
DRG: 426 | End: 2021-04-24
Payer: COMMERCIAL

## 2021-04-24 LAB
ALBUMIN SERPL-MCNC: 3.6 G/DL (ref 3.4–5)
ANION GAP SERPL CALCULATED.3IONS-SCNC: 7 MMOL/L (ref 3–16)
BUN BLDV-MCNC: 7 MG/DL (ref 7–20)
CALCIUM SERPL-MCNC: 8.5 MG/DL (ref 8.3–10.6)
CHLORIDE BLD-SCNC: 106 MMOL/L (ref 99–110)
CHLORIDE URINE RANDOM: 25 MMOL/L
CO2: 24 MMOL/L (ref 21–32)
CREAT SERPL-MCNC: 0.6 MG/DL (ref 0.6–1.1)
CREATININE URINE: 31.6 MG/DL (ref 28–259)
GFR AFRICAN AMERICAN: >60
GFR NON-AFRICAN AMERICAN: >60
GLUCOSE BLD-MCNC: 101 MG/DL (ref 70–99)
GLUCOSE BLD-MCNC: 105 MG/DL (ref 70–99)
GLUCOSE BLD-MCNC: 132 MG/DL (ref 70–99)
GLUCOSE BLD-MCNC: 140 MG/DL (ref 70–99)
PERFORMED ON: ABNORMAL
PHOSPHORUS: 2.8 MG/DL (ref 2.5–4.9)
POTASSIUM SERPL-SCNC: 3.9 MMOL/L (ref 3.5–5.1)
POTASSIUM, UR: 13 MMOL/L
PREALBUMIN: 19.8 MG/DL (ref 20–40)
SODIUM BLD-SCNC: 137 MMOL/L (ref 136–145)
SODIUM URINE: 35 MMOL/L

## 2021-04-24 PROCEDURE — 6360000002 HC RX W HCPCS: Performed by: PHYSICIAN ASSISTANT

## 2021-04-24 PROCEDURE — 99232 SBSQ HOSP IP/OBS MODERATE 35: CPT | Performed by: PSYCHIATRY & NEUROLOGY

## 2021-04-24 PROCEDURE — A9577 INJ MULTIHANCE: HCPCS | Performed by: PSYCHIATRY & NEUROLOGY

## 2021-04-24 PROCEDURE — 2060000000 HC ICU INTERMEDIATE R&B

## 2021-04-24 PROCEDURE — 6370000000 HC RX 637 (ALT 250 FOR IP): Performed by: PHYSICIAN ASSISTANT

## 2021-04-24 PROCEDURE — 2580000003 HC RX 258: Performed by: PHYSICIAN ASSISTANT

## 2021-04-24 PROCEDURE — 84134 ASSAY OF PREALBUMIN: CPT

## 2021-04-24 PROCEDURE — 80069 RENAL FUNCTION PANEL: CPT

## 2021-04-24 PROCEDURE — 6360000004 HC RX CONTRAST MEDICATION: Performed by: PSYCHIATRY & NEUROLOGY

## 2021-04-24 PROCEDURE — 6370000000 HC RX 637 (ALT 250 FOR IP): Performed by: HOSPITALIST

## 2021-04-24 PROCEDURE — 70553 MRI BRAIN STEM W/O & W/DYE: CPT

## 2021-04-24 PROCEDURE — 36415 COLL VENOUS BLD VENIPUNCTURE: CPT

## 2021-04-24 RX ORDER — METHOCARBAMOL 750 MG/1
750 TABLET, FILM COATED ORAL ONCE
Status: COMPLETED | OUTPATIENT
Start: 2021-04-24 | End: 2021-04-24

## 2021-04-24 RX ADMIN — ENOXAPARIN SODIUM 40 MG: 40 INJECTION SUBCUTANEOUS at 10:10

## 2021-04-24 RX ADMIN — METOPROLOL SUCCINATE 25 MG: 25 TABLET, EXTENDED RELEASE ORAL at 10:10

## 2021-04-24 RX ADMIN — ACETAMINOPHEN 650 MG: 325 TABLET ORAL at 16:44

## 2021-04-24 RX ADMIN — METHOCARBAMOL 750 MG: 750 TABLET ORAL at 22:28

## 2021-04-24 RX ADMIN — TOPIRAMATE 100 MG: 100 TABLET, FILM COATED ORAL at 10:10

## 2021-04-24 RX ADMIN — ACETAMINOPHEN 650 MG: 325 TABLET ORAL at 23:54

## 2021-04-24 RX ADMIN — TOPIRAMATE 100 MG: 100 TABLET, FILM COATED ORAL at 20:22

## 2021-04-24 RX ADMIN — Medication 10 ML: at 20:30

## 2021-04-24 RX ADMIN — Medication 10 ML: at 10:11

## 2021-04-24 RX ADMIN — HYDROCORTISONE: 25 CREAM TOPICAL at 04:36

## 2021-04-24 RX ADMIN — GADOBENATE DIMEGLUMINE 10 ML: 529 INJECTION, SOLUTION INTRAVENOUS at 12:28

## 2021-04-24 RX ADMIN — INSULIN LISPRO 1 UNITS: 100 INJECTION, SOLUTION INTRAVENOUS; SUBCUTANEOUS at 22:29

## 2021-04-24 RX ADMIN — AMITRIPTYLINE HYDROCHLORIDE 10 MG: 10 TABLET, FILM COATED ORAL at 20:22

## 2021-04-24 RX ADMIN — ATORVASTATIN CALCIUM 20 MG: 20 TABLET, FILM COATED ORAL at 10:11

## 2021-04-24 RX ADMIN — DULOXETINE HYDROCHLORIDE 30 MG: 30 CAPSULE, DELAYED RELEASE ORAL at 10:10

## 2021-04-24 RX ADMIN — CETIRIZINE HYDROCHLORIDE 10 MG: 10 TABLET, FILM COATED ORAL at 10:10

## 2021-04-24 RX ADMIN — PANTOPRAZOLE SODIUM 40 MG: 40 TABLET, DELAYED RELEASE ORAL at 05:39

## 2021-04-24 ASSESSMENT — ENCOUNTER SYMPTOMS
EYE DISCHARGE: 0
COUGH: 0
EYE REDNESS: 0
ABDOMINAL DISTENTION: 0
VOMITING: 0
CHEST TIGHTNESS: 0
DIARRHEA: 0
BLOOD IN STOOL: 0
PHOTOPHOBIA: 0
FACIAL SWELLING: 0
ABDOMINAL PAIN: 0
SHORTNESS OF BREATH: 0
NAUSEA: 0
CONSTIPATION: 0

## 2021-04-24 ASSESSMENT — PAIN SCALES - GENERAL
PAINLEVEL_OUTOF10: 8
PAINLEVEL_OUTOF10: 0

## 2021-04-24 ASSESSMENT — PAIN DESCRIPTION - LOCATION: LOCATION: BACK;CHEST

## 2021-04-24 NOTE — PROGRESS NOTES
[facility] Note    Patient Active Problem List   Diagnosis    Dizziness    Chest pain    Depression    Essential hypertension    GERD (gastroesophageal reflux disease)    Type 2 diabetes mellitus without complication, without long-term current use of insulin (HCC)    Hyponatremia    Severe malnutrition (HCC)       Past Medical History:   has a past medical history of Arthritis, Depression, Diabetes mellitus ([address] Fibromyalgia, GERD (gastroesophageal reflux disease), Hemorrhoid, Hyperlipidemia, Hypertension, Lower back pain, Migraine, and Thyroid disease. Past Social History:   reports that she has never smoked. She has never used smokeless tobacco. She reports that she does not drink alcohol or use drugs. Subjective:  No neurologic symptoms    Review of Systems   Constitutional: Positive for fatigue. Negative for activity change, appetite change, chills, fever and unexpected weight change. HENT: Negative for congestion and facial swelling. Eyes: Negative for photophobia, discharge and redness. Respiratory: Negative for cough, chest tightness and shortness of breath. Cardiovascular: Negative for chest pain, palpitations and leg swelling. Gastrointestinal: Negative for abdominal distention, abdominal pain, blood in stool, constipation, diarrhea, nausea and vomiting. Endocrine: Negative for cold intolerance, heat intolerance and polyuria. Genitourinary: Negative for decreased urine volume, difficulty urinating, flank pain and hematuria. Musculoskeletal: Negative for joint swelling and neck pain. Neurological: Negative for dizziness, seizures, syncope, speech difficulty, light-headedness and headaches. Hematological: Does not bruise/bleed easily. Psychiatric/Behavioral: Negative for agitation, confusion and hallucinations.        Objective:      BP 91/61   Pulse 75   Temp 97.4 °F (36.3 °C) (Tympanic)   Resp 18   Ht 5' 2\" (1.575 m)   Wt 106 lb 9.6 oz (48.4 kg)   SpO2 97%   BMI 19.50 kg/m²     Wt Readings from Last 3 Encounters:   04/23/21 106 lb 9.6 oz (48.4 kg)   02/14/21 122 lb (55.3 kg)   11/18/20 135 lb (61.2 kg)       BP Readings from Last 3 Encounters:   04/24/21 91/61   02/15/21 126/81   11/19/20 (!) 150/106     Chest- clear  Heart-regular  Abd-soft  Ext- no edema    Labs  Hemoglobin   Date Value Ref Range Status   04/23/2021 13.8 12.0 - 16.0 g/dL Final     Hematocrit   Date Value Ref Range Status   04/23/2021 40.1 36.0 - 48.0 % Final     WBC   Date Value Ref Range Status   04/23/2021 8.2 4.0 - 11.0 K/uL Final     Platelets   Date Value Ref Range Status   04/23/2021 376 135 - 450 K/uL Final     Lab Results   Component Value Date    CREATININE 0.6 04/24/2021    BUN 7 04/24/2021     04/24/2021    K 3.9 04/24/2021     04/24/2021    CO2 24 04/24/2021     Uosm- pending  [address]    IMPRESSION / RECOMMENDATIONS:      Hyponatremia, moderate range,  -Likely acute, as recent labs about 2 months ago showed normal Na, no history of hyponatremia in the past,  -With neurological symptoms such as seizures, weakness, with history of seizures in the past,  -Risk factors- ?  Hypovolemia, hypokalemia, hydrochlorothiazide, SSRI use\  -f/u osmolality  -Frequent sodium check. Lowest Na 122, currently 137. Stop IVF. Rate of correction adequate.    -Goal serum sodium increase around 8-10 points in 24 hours    BP lower limits- just on low dose Metoprolol    She should avoid thiazides.       D/C plan from renal stand point:  - okay for D/C from renal perspective once okay with Neurology and Medicine           Robin Madden MD

## 2021-04-24 NOTE — PROGRESS NOTES
Billye Cheadle  Neurology Follow-up  [facility]    Date of Service: 4/24/2021    Subjective:   CC: Follow up today regarding: Possible new onset seizure    Events noted. Chart and lab reviewed. The patient denies any new symptoms  except for mild lightheadedness. EEG showed no epileptiform discharges. Just came from MRI. Results are pending  Other review of system was unremarkable. ROS : A 10-12 system review obtained and updated today and is unremarkable except as mentioned  in my interval history.        Current Facility-Administered Medications   Medication Dose Route Frequency Provider Last Rate Last Admin    amitriptyline (ELAVIL) tablet 10 mg  10 mg Oral Nightly Valentina Madden PA-C   10 mg at 04/23/21 2226    DULoxetine (CYMBALTA) extended release capsule 30 mg  30 mg Oral Daily Valentina Madden PA-C   30 mg at 04/24/21 1010    fluticasone (FLONASE) 50 MCG/ACT nasal spray 2 spray  2 spray Nasal Daily Valentina Madden PA-C        cetirizine (ZYRTEC) tablet 10 mg  10 mg Oral Daily MARCELLUS HuntC   10 mg at 04/24/21 1010    melatonin tablet 3 mg  3 mg Oral Nightly PRN Valentina Madden PA-C        metoprolol succinate (TOPROL XL) extended release tablet 25 mg  25 mg Oral Daily MARCELLUS HuntC   25 mg at 04/24/21 1010    pantoprazole (PROTONIX) tablet 40 mg  40 mg Oral QAM AC Valentina Madden PA-C   40 mg at 04/24/21 0539    topiramate (TOPAMAX) tablet 100 mg  100 mg Oral BID Valentina Madden PA-C   100 mg at 04/24/21 1010    sodium chloride flush 0.9 % injection 10 mL  10 mL Intravenous 2 times per day Valentina Madden PA-C   10 mL at 04/24/21 1011    sodium chloride flush 0.9 % injection 10 mL  10 mL Intravenous PRN Valentina Madden PA-C        0.9 % sodium chloride infusion  25 mL Intravenous PRN Valentina Madden PA-C        enoxaparin (LOVENOX) injection 40 mg  40 mg Subcutaneous Daily Valentina Madden PA-C   40 mg at 04/24/21 1010    magnesium hydroxide (MILK OF MAGNESIA) 400 MG/5ML suspension 30 mL  30 mL Oral Daily PRN Veronica Desai PA-C        acetaminophen (TYLENOL) tablet 650 mg  650 mg Oral Q6H PRN Veronica Desai PA-C   650 mg at 04/23/21 2229    Or    acetaminophen (TYLENOL) suppository 650 mg  650 mg Rectal Q6H PRN Veronica Desai PA-C        ondansetron [facility]) injection 4 mg  4 mg Intravenous Q6H PRN MARCELLUS BrownC   4 mg at 04/23/21 2231    insulin lispro (1 Unit Dial) 0-6 Units  0-6 Units Subcutaneous TID WC Veronica Desai PA-C        insulin lispro (1 Unit Dial) 0-3 Units  0-3 Units Subcutaneous Nightly Veronica Desai PA-C   Stopped at 04/23/21 0117    glucose (GLUTOSE) 40 % oral gel 15 g  15 g Oral PRN Veronica Desai PA-C        dextrose 50 % IV solution  12.5 g Intravenous PRN Veronica Desai PA-C        glucagon (rDNA) injection 1 mg  1 mg Intramuscular PRN Veronica Desai PA-C        dextrose 5 % solution  100 mL/hr Intravenous PRN Veronica Desai PA-C        LORazepam (ATIVAN) injection 2 mg  2 mg Intravenous Q4H PRN Veronica Desai PA-C        atorvastatin (LIPITOR) tablet 20 mg  20 mg Oral Daily Mariano Guillen MD   20 mg at 04/24/21 1011    hydrocortisone (ANUSOL-HC) 2.5 % rectal cream   Rectal BID PRN Veronica Desai PA-C   Given at 04/24/21 8122     Allergies   Allergen Reactions    Asa [Aspirin] Nausea Only     Past Medical History:   Diagnosis Date    Arthritis     Depression     Diabetes mellitus ([address]     Fibromyalgia     GERD (gastroesophageal reflux disease)     Hemorrhoid     Hyperlipidemia     Hypertension     Lower back pain     Migraine     Thyroid disease          Objective:  Exam:   Constitutional:   Vitals:    04/23/21 1611 04/23/21 2120 04/24/21 0045 04/24/21 0430   BP: 101/69 93/61 92/62 91/61   Pulse: 80 88 79 75   Resp: 18 18 18 18   Temp: 97.4 °F (36.3 °C) 97.4 °F (36.3 °C) 97.7 °F (36.5 °C) 97.4 °F (36.3 °C)   TempSrc: Oral Oral Oral Tympanic   SpO2: 98%  98% 97%   Weight:       Height:         General appearance:  Normal development and appear in no acute distress. Mental Status: Per her daughter  Oriented to person, place, problem, and time. Memory: Good immediate recall. Intact remote memory  Normal attention span and concentration. Language: intact naming, repeating and fluency   Good fund of Knowledge. Cranial Nerves:   II:   Pupils: equal, round, reactive to light  III,IV,VI: Extra Ocular Movements are intact. No nystagmus  V: Facial sensation is intact  VII: Facial strength and movements: intact and symmetric  XII: Tongue movements are normal  Musculoskeletal: 5/5 in all 4 extremities. Reflexes: Symmetric 2+ in both arms and legs. Coordination: no pronator drift, no dysmetria with FNF. Normal REM. Sensation: normal to all modalities in both arms and legs. Gait/Posture: steady gait        Data:  LABS:   Lab Results   Component Value Date     04/24/2021    K 3.9 04/24/2021    K 3.1 04/22/2021     04/24/2021    CO2 24 04/24/2021    BUN 7 04/24/2021    CREATININE 0.6 04/24/2021    GFRAA >60 04/24/2021    LABGLOM >60 04/24/2021    GLUCOSE 105 04/24/2021    PHOS 2.8 04/24/2021    MG 1.90 04/22/2021    CALCIUM 8.5 04/24/2021     Lab Results   Component Value Date    WBC 8.2 04/23/2021    RBC 4.63 04/23/2021    HGB 13.8 04/23/2021    HCT 40.1 04/23/2021    MCV 86.6 04/23/2021    RDW 12.3 04/23/2021     04/23/2021     Lab Results   Component Value Date    INR 0.99 04/22/2021    PROTIME 11.5 04/22/2021       Neuroimaging was independently reviewed by me. MRI by my reading showed no evidence of acute stroke or abnormal lesion. Nonspecific subcortical white matter disease. Awaiting official reports.   patient  I reviewed blood testing and other test results including EEG and discussed results with the patient and her daughter      Impression:  Acute encephalopathy and new onset seizure episode, so far cryptogenic  Hyponatremia. Improved  Chronic migraine with aura  Hypertension  Diabetes, not controlled  Hyperlipidemia      Recommendation  Continue Topamax 100 mg twice daily  Discussed side effect and possible kidney stones  Seizure precaution including driving restrictions. She does not drive according to her daughter  Continue statin and aspirin for stroke prevention  Insulin sliding scale  Blood sugar control  Follow sodium level  Continue home blood pressure medications  Can be discharged from neurology once medically stable  Follow-up with neurology if symptoms recur  No further recommendation. Aidee Verduzco MD   [phone]      This dictation was generated by voice recognition computer software. Although all attempts are made to edit the dictation for accuracy, there may be errors in the transcription that are not intended.

## 2021-04-24 NOTE — PROCEDURES
Patient: Allyssa Neri    MR Number: [id number]  YOB: 1975  Date of Visit: 4/23/2021    Clinical History:  The patient is a 54y.o. years old female with new onset seizure       Method: The EEG was performed utilizing the international 10/20 of electrode placements of both referential and bipolar montages. The patient was awake and drowsy through out the recording. Photic stimulation was performed. Findings: The background of the EEG showed normal alpha posterior background of 9-10- HZ and amplitude of 20-40 UV. This background was symmetric, waxing and waning, and reactive with eye opening and closure. As the patient became drowsy, generalized diffuse slowing was seen through recording at 6-7 HZ. This generalized slowing was symmetric, non rhythmical, and continuous. No spike or sharp waves were seen. Photic stimulation did not activate EEG. Impression: This EEG  is within normal limits. There is no evidence of epileptiform discharges, focal, or lateralizing abnormalities.       Chantal Elliott MD      Board certified in clinical neurophysiology

## 2021-04-24 NOTE — PLAN OF CARE
Assessment complete. See flow sheet. Pain evaluation complete. Tylenol given for pain, Zofran given for nausea , patient is resting at this time with no c/o of distress . Patient encouraged to use call light with any needs. Patient states understanding, call light in reach, bed alarm on. Daughter at bedside Will continue to monitor.                     Problem: Skin Integrity:  Goal: Will show no infection signs and symptoms  Description: Will show no infection signs and symptoms  Outcome: Ongoing     Problem: Pain:  Goal: Pain level will decrease  Description: Pain level will decrease  4/24/2021 0359 by Ever Geller RN  Outcome: Ongoing  4/24/2021 0313 by Ever Geller RN  Outcome: Ongoing

## 2021-04-24 NOTE — PROGRESS NOTES
[facility] PROGRESS NOTE    4/24/2021 9:07 AM        Name: Sheron Howard . Admitted: 4/22/2021  Primary Care Provider: Davian Lugo (Tel: [phone])                        Subjective:  . No acute events overnight. Resting well. Pain control. Diet ok. Labs reviewed  Denies any chest pain sob.      Reviewed interval ancillary notes    Current Medications  amitriptyline (ELAVIL) tablet 10 mg, Nightly  DULoxetine (CYMBALTA) extended release capsule 30 mg, Daily  fluticasone (FLONASE) 50 MCG/ACT nasal spray 2 spray, Daily  cetirizine (ZYRTEC) tablet 10 mg, Daily  melatonin tablet 3 mg, Nightly PRN  metoprolol succinate (TOPROL XL) extended release tablet 25 mg, Daily  pantoprazole (PROTONIX) tablet 40 mg, QAM AC  topiramate (TOPAMAX) tablet 100 mg, BID  sodium chloride flush 0.9 % injection 10 mL, 2 times per day  sodium chloride flush 0.9 % injection 10 mL, PRN  0.9 % sodium chloride infusion, PRN  enoxaparin (LOVENOX) injection 40 mg, Daily  magnesium hydroxide (MILK OF MAGNESIA) 400 MG/5ML suspension 30 mL, Daily PRN  acetaminophen (TYLENOL) tablet 650 mg, Q6H PRN    Or  acetaminophen (TYLENOL) suppository 650 mg, Q6H PRN  ondansetron (ZOFRAN) injection 4 mg, Q6H PRN  insulin lispro (1 Unit Dial) 0-6 Units, TID WC  insulin lispro (1 Unit Dial) 0-3 Units, Nightly  glucose (GLUTOSE) 40 % oral gel 15 g, PRN  dextrose 50 % IV solution, PRN  glucagon (rDNA) injection 1 mg, PRN  dextrose 5 % solution, PRN  LORazepam (ATIVAN) injection 2 mg, Q4H PRN  atorvastatin (LIPITOR) tablet 20 mg, Daily  hydrocortisone (ANUSOL-HC) 2.5 % rectal cream, BID PRN        Objective:  BP 91/61   Pulse 75   Temp 97.4 °F (36.3 °C) (Tympanic)   Resp 18   Ht 5' 2\" (1.575 m)   Wt 106 lb 9.6 oz (48.4 kg)   SpO2 97%   BMI 19.50 kg/m²     Intake/Output Summary (Last 24 hours) at 4/24/2021 6243  Last data filed at 4/24/2021 0045  Gross per 24 hour   Intake 958.75 ml   Output --   Net 958.75 ml      Wt Readings from Last 3 Encounters:   04/23/21 106 lb 9.6 oz (48.4 kg)   02/14/21 122 lb (55.3 kg)   11/18/20 135 lb (61.2 kg)       General appearance:  Appears comfortable  Eyes: Sclera clear. Pupils equal.  ENT: Moist oral mucosa. Trachea midline, no adenopathy. Cardiovascular: Regular rhythm, normal S1, S2. No murmur. No edema in lower extremities  Respiratory: Not using accessory muscles. Good inspiratory effort. Clear to auscultation bilaterally, no wheeze or crackles. GI: Abdomen soft, no tenderness, not distended, normal bowel sounds  Musculoskeletal: No cyanosis in digits, neck supple  Neurology: CN 2-12 grossly intact. No speech or motor deficits  Psych: Normal affect. Alert and oriented in time, place and person  Skin: Warm, dry, normal turgor    Labs and Tests:  CBC:   Recent Labs     04/22/21  1550 04/23/21  0123   WBC 8.3 8.2   HGB 14.3 13.8    376     BMP:    Recent Labs     04/23/21  0123 04/23/21  0706 04/23/21  0857 04/24/21  0504   * 133* 132* 137   K 2.8*  --  3.5 3.9   CL 92*  --  98* 106   CO2 29  --  24 24   BUN 8  --  7 7   CREATININE 0.6  --  <0.5* 0.6   GLUCOSE 97  --  95 105*     Hepatic:   Recent Labs     04/22/21  1550   AST 25   ALT 24   BILITOT 1.3*   ALKPHOS 91       Discussed care with family and patient             Spent 30  minutes with patient and family at bedside and on unit reviewing medical records and labs, spent greater than 50% time counseling patient and family on diagnosis and plan   Problem List  Active Problems:    Hyponatremia    Severe malnutrition ([address]  Resolved Problems:    * No resolved hospital problems. *       Assessment & Plan:   1. Hyponatremia  -Continue to monitor serum was 122  Improved. -Regimen per nephrology    Seizure-like activity  -Probably secondary to above. -Appreciate neurology recommendation  -MRI ordered.   Ordered

## 2021-04-25 ENCOUNTER — APPOINTMENT (OUTPATIENT)
Dept: CT IMAGING | Age: 46
DRG: 426 | End: 2021-04-25
Payer: COMMERCIAL

## 2021-04-25 LAB
ALBUMIN SERPL-MCNC: 3.3 G/DL (ref 3.4–5)
ANION GAP SERPL CALCULATED.3IONS-SCNC: 10 MMOL/L (ref 3–16)
BUN BLDV-MCNC: 5 MG/DL (ref 7–20)
CALCIUM SERPL-MCNC: 8.8 MG/DL (ref 8.3–10.6)
CHLORIDE BLD-SCNC: 108 MMOL/L (ref 99–110)
CO2: 19 MMOL/L (ref 21–32)
CREAT SERPL-MCNC: <0.5 MG/DL (ref 0.6–1.1)
GFR AFRICAN AMERICAN: >60
GFR NON-AFRICAN AMERICAN: >60
GLUCOSE BLD-MCNC: 105 MG/DL (ref 70–99)
GLUCOSE BLD-MCNC: 115 MG/DL (ref 70–99)
GLUCOSE BLD-MCNC: 146 MG/DL (ref 70–99)
GLUCOSE BLD-MCNC: 146 MG/DL (ref 70–99)
GLUCOSE BLD-MCNC: 187 MG/DL (ref 70–99)
GLUCOSE BLD-MCNC: 88 MG/DL (ref 70–99)
OSMOLALITY URINE: 191 MOSM/KG (ref 390–1070)
PERFORMED ON: ABNORMAL
PERFORMED ON: NORMAL
PHOSPHORUS: 3.4 MG/DL (ref 2.5–4.9)
POTASSIUM SERPL-SCNC: 3.8 MMOL/L (ref 3.5–5.1)
SODIUM BLD-SCNC: 137 MMOL/L (ref 136–145)

## 2021-04-25 PROCEDURE — 6370000000 HC RX 637 (ALT 250 FOR IP): Performed by: HOSPITALIST

## 2021-04-25 PROCEDURE — 2060000000 HC ICU INTERMEDIATE R&B

## 2021-04-25 PROCEDURE — 72131 CT LUMBAR SPINE W/O DYE: CPT

## 2021-04-25 PROCEDURE — 2580000003 HC RX 258: Performed by: PHYSICIAN ASSISTANT

## 2021-04-25 PROCEDURE — 80069 RENAL FUNCTION PANEL: CPT

## 2021-04-25 PROCEDURE — 84439 ASSAY OF FREE THYROXINE: CPT

## 2021-04-25 PROCEDURE — 6360000002 HC RX W HCPCS: Performed by: PHYSICIAN ASSISTANT

## 2021-04-25 PROCEDURE — 6370000000 HC RX 637 (ALT 250 FOR IP): Performed by: PHYSICIAN ASSISTANT

## 2021-04-25 PROCEDURE — 36415 COLL VENOUS BLD VENIPUNCTURE: CPT

## 2021-04-25 PROCEDURE — 84443 ASSAY THYROID STIM HORMONE: CPT

## 2021-04-25 RX ORDER — TIZANIDINE 4 MG/1
2 TABLET ORAL EVERY 8 HOURS PRN
Status: DISCONTINUED | OUTPATIENT
Start: 2021-04-25 | End: 2021-04-26 | Stop reason: HOSPADM

## 2021-04-25 RX ORDER — PROPRANOLOL HYDROCHLORIDE 20 MG/1
10 TABLET ORAL 2 TIMES DAILY
Status: DISCONTINUED | OUTPATIENT
Start: 2021-04-25 | End: 2021-04-26 | Stop reason: HOSPADM

## 2021-04-25 RX ADMIN — DULOXETINE HYDROCHLORIDE 30 MG: 30 CAPSULE, DELAYED RELEASE ORAL at 09:42

## 2021-04-25 RX ADMIN — ENOXAPARIN SODIUM 40 MG: 40 INJECTION SUBCUTANEOUS at 09:42

## 2021-04-25 RX ADMIN — TOPIRAMATE 100 MG: 100 TABLET, FILM COATED ORAL at 09:42

## 2021-04-25 RX ADMIN — AMITRIPTYLINE HYDROCHLORIDE 10 MG: 10 TABLET, FILM COATED ORAL at 21:14

## 2021-04-25 RX ADMIN — Medication 10 ML: at 09:43

## 2021-04-25 RX ADMIN — INSULIN LISPRO 1 UNITS: 100 INJECTION, SOLUTION INTRAVENOUS; SUBCUTANEOUS at 21:15

## 2021-04-25 RX ADMIN — PROPRANOLOL HYDROCHLORIDE 10 MG: 20 TABLET ORAL at 21:14

## 2021-04-25 RX ADMIN — ACETAMINOPHEN 650 MG: 325 TABLET ORAL at 21:17

## 2021-04-25 RX ADMIN — ATORVASTATIN CALCIUM 20 MG: 20 TABLET, FILM COATED ORAL at 09:42

## 2021-04-25 RX ADMIN — TOPIRAMATE 100 MG: 100 TABLET, FILM COATED ORAL at 21:14

## 2021-04-25 RX ADMIN — METOPROLOL SUCCINATE 25 MG: 25 TABLET, EXTENDED RELEASE ORAL at 09:42

## 2021-04-25 RX ADMIN — PANTOPRAZOLE SODIUM 40 MG: 40 TABLET, DELAYED RELEASE ORAL at 07:30

## 2021-04-25 RX ADMIN — CETIRIZINE HYDROCHLORIDE 10 MG: 10 TABLET, FILM COATED ORAL at 09:42

## 2021-04-25 RX ADMIN — Medication 10 ML: at 21:23

## 2021-04-25 ASSESSMENT — ENCOUNTER SYMPTOMS
SHORTNESS OF BREATH: 0
EYE REDNESS: 0
FACIAL SWELLING: 0
VOMITING: 0
DIARRHEA: 0
EYE DISCHARGE: 0
ABDOMINAL PAIN: 0
BLOOD IN STOOL: 0
ABDOMINAL DISTENTION: 0
CONSTIPATION: 0
CHEST TIGHTNESS: 0
COUGH: 0
PHOTOPHOBIA: 0
NAUSEA: 0

## 2021-04-25 ASSESSMENT — PAIN DESCRIPTION - DESCRIPTORS: DESCRIPTORS: HEADACHE

## 2021-04-25 ASSESSMENT — PAIN SCALES - GENERAL
PAINLEVEL_OUTOF10: 8
PAINLEVEL_OUTOF10: 0

## 2021-04-25 ASSESSMENT — PAIN DESCRIPTION - PAIN TYPE: TYPE: CHRONIC PAIN

## 2021-04-25 ASSESSMENT — PAIN DESCRIPTION - LOCATION: LOCATION: HEAD

## 2021-04-25 NOTE — PROGRESS NOTES
VSS, assessment as charted. Reports continued pain in back and chest, rated a level 4. Offered Tylenol, accepted, same administered. Explained to daughter @bedside seizure precautions are ordered, no seizure pads on bed. She discussed with patient, they agreed and are refusing placement of seizure pads @this time. Allergy armband placed, denies other needs @present.

## 2021-04-25 NOTE — PROGRESS NOTES
[facility] Note    Patient Active Problem List   Diagnosis    Dizziness    Chest pain    Depression    Essential hypertension    GERD (gastroesophageal reflux disease)    Type 2 diabetes mellitus without complication, without long-term current use of insulin (HCC)    Hyponatremia    Severe malnutrition (HCC)    Witnessed seizure-like activity (HCC)       Past Medical History:   has a past medical history of Arthritis, Depression, Diabetes mellitus ([address] Fibromyalgia, GERD (gastroesophageal reflux disease), Hemorrhoid, Hyperlipidemia, Hypertension, Lower back pain, Migraine, and Thyroid disease. Past Social History:   reports that she has never smoked. She has never used smokeless tobacco. She reports that she does not drink alcohol or use drugs. Subjective:  Had some tremors per nurse. Not verbalizing any complaints. Review of Systems   Constitutional: Positive for fatigue. Negative for activity change, appetite change, chills, fever and unexpected weight change. HENT: Negative for congestion and facial swelling. Eyes: Negative for photophobia, discharge and redness. Respiratory: Negative for cough, chest tightness and shortness of breath. Cardiovascular: Negative for chest pain, palpitations and leg swelling. Gastrointestinal: Negative for abdominal distention, abdominal pain, blood in stool, constipation, diarrhea, nausea and vomiting. Endocrine: Negative for cold intolerance, heat intolerance and polyuria. Genitourinary: Negative for decreased urine volume, difficulty urinating, flank pain and hematuria. Musculoskeletal: Negative for joint swelling and neck pain. Neurological: Negative for dizziness, seizures, syncope, speech difficulty, light-headedness and headaches. Hematological: Does not bruise/bleed easily. Psychiatric/Behavioral: Negative for agitation, confusion and hallucinations.        Objective: /66   Pulse 72   Temp 97.6 °F (36.4 °C) (Oral)   Resp 18   Ht 5' 2\" (1.575 m)   Wt 106 lb 9.6 oz (48.4 kg)   SpO2 98%   BMI 19.50 kg/m²     Wt Readings from Last 3 Encounters:   04/23/21 106 lb 9.6 oz (48.4 kg)   02/14/21 122 lb (55.3 kg)   11/18/20 135 lb (61.2 kg)       BP Readings from Last 3 Encounters:   04/25/21 101/66   02/15/21 126/81   11/19/20 (!) 150/106     Chest- clear  Heart-regular  Abd-soft  Ext- no edema    Labs  Hemoglobin   Date Value Ref Range Status   04/23/2021 13.8 12.0 - 16.0 g/dL Final     Hematocrit   Date Value Ref Range Status   04/23/2021 40.1 36.0 - 48.0 % Final     WBC   Date Value Ref Range Status   04/23/2021 8.2 4.0 - 11.0 K/uL Final     Platelets   Date Value Ref Range Status   04/23/2021 376 135 - 450 K/uL Final     Lab Results   Component Value Date    CREATININE <0.5 (L) 04/25/2021    BUN 5 (L) 04/25/2021     04/25/2021    K 3.8 04/25/2021     04/25/2021    CO2 19 (L) 04/25/2021     [address]    IMPRESSION / RECOMMENDATIONS:      Hyponatremia, moderate range, now improved to 137. Rate of correction acceptable.   -Likely acute, as recent labs about 2 months ago showed normal Na, no history of hyponatremia in the past,  -With neurological symptoms such as seizures, weakness, with history of seizures in the past,  -Risk factors- ?  Hypovolemia, hypokalemia, hydrochlorothiazide, SSRI use  -Lowest Na 122, currently 137. Stopped IVF. Rate of correction adequate.    -Goal serum sodium increase around 8-10 points in 24 hours    BP lower limits- just on low dose Metoprolol    She should avoid thiazides.       D/C plan from renal stand point:  - okay for D/C from renal perspective once okay with Neurology and Medicine           Stoney Gillis MD

## 2021-04-25 NOTE — PROGRESS NOTES
has hx of lipoma resection in that area     Hypokalemia replace    Severe deconditioning  -[facility] consulted  - benefit for rehab     Malnutrition  -severe  -       Diet: DIET GENERAL; Vegetarian (Lacto-Ovo)  Dietary Nutrition Supplements: Standard High Calorie Oral Supplement  Code:Full Code  DVT PPX alyssa Paz MD   4/25/2021 11:26 AM

## 2021-04-25 NOTE — PROGRESS NOTES
Pt had an episode of what appeared to be tremors in all extremities at the same time. Pt reports no aura or sensation prior to onset. She sates once it subsides she gets the sensation of pins and needles in all extremities when it stops. She remains alert and able to talk during these episodes. These have been on going prior to admission. One of these occurred here and her daughter has capture one on video. She is bedside today. Neuro and Hospitalitis notified via [employer] serv .

## 2021-04-26 VITALS
HEART RATE: 79 BPM | TEMPERATURE: 98.6 F | DIASTOLIC BLOOD PRESSURE: 72 MMHG | BODY MASS INDEX: 20 KG/M2 | HEIGHT: 62 IN | SYSTOLIC BLOOD PRESSURE: 122 MMHG | WEIGHT: 108.7 LBS | RESPIRATION RATE: 16 BRPM | OXYGEN SATURATION: 96 %

## 2021-04-26 LAB
ALBUMIN SERPL-MCNC: 3.6 G/DL (ref 3.4–5)
ANION GAP SERPL CALCULATED.3IONS-SCNC: 9 MMOL/L (ref 3–16)
BLOOD CULTURE, ROUTINE: NORMAL
BUN BLDV-MCNC: 5 MG/DL (ref 7–20)
CALCIUM SERPL-MCNC: 9.1 MG/DL (ref 8.3–10.6)
CHLORIDE BLD-SCNC: 111 MMOL/L (ref 99–110)
CO2: 22 MMOL/L (ref 21–32)
CREAT SERPL-MCNC: <0.5 MG/DL (ref 0.6–1.1)
CULTURE, BLOOD 2: NORMAL
GFR AFRICAN AMERICAN: >60
GFR NON-AFRICAN AMERICAN: >60
GLUCOSE BLD-MCNC: 129 MG/DL (ref 70–99)
GLUCOSE BLD-MCNC: 87 MG/DL (ref 70–99)
GLUCOSE BLD-MCNC: 93 MG/DL (ref 70–99)
PERFORMED ON: ABNORMAL
PERFORMED ON: NORMAL
PHOSPHORUS: 3.8 MG/DL (ref 2.5–4.9)
POTASSIUM SERPL-SCNC: 3.7 MMOL/L (ref 3.5–5.1)
SODIUM BLD-SCNC: 142 MMOL/L (ref 136–145)
T4 FREE: 4 NG/DL (ref 0.9–1.8)
TSH REFLEX: 0.03 UIU/ML (ref 0.27–4.2)

## 2021-04-26 PROCEDURE — 6370000000 HC RX 637 (ALT 250 FOR IP): Performed by: PHYSICIAN ASSISTANT

## 2021-04-26 PROCEDURE — 80069 RENAL FUNCTION PANEL: CPT

## 2021-04-26 PROCEDURE — 99232 SBSQ HOSP IP/OBS MODERATE 35: CPT | Performed by: PSYCHIATRY & NEUROLOGY

## 2021-04-26 PROCEDURE — 6360000002 HC RX W HCPCS: Performed by: PHYSICIAN ASSISTANT

## 2021-04-26 PROCEDURE — 36415 COLL VENOUS BLD VENIPUNCTURE: CPT

## 2021-04-26 PROCEDURE — 2580000003 HC RX 258: Performed by: PHYSICIAN ASSISTANT

## 2021-04-26 PROCEDURE — 6370000000 HC RX 637 (ALT 250 FOR IP): Performed by: HOSPITALIST

## 2021-04-26 RX ORDER — PROPRANOLOL HYDROCHLORIDE 10 MG/1
10 TABLET ORAL 2 TIMES DAILY
Qty: 90 TABLET | Refills: 3 | Status: SHIPPED | OUTPATIENT
Start: 2021-04-26

## 2021-04-26 RX ADMIN — MAGNESIUM HYDROXIDE 30 ML: 400 SUSPENSION ORAL at 10:43

## 2021-04-26 RX ADMIN — DULOXETINE HYDROCHLORIDE 30 MG: 30 CAPSULE, DELAYED RELEASE ORAL at 10:43

## 2021-04-26 RX ADMIN — ATORVASTATIN CALCIUM 20 MG: 20 TABLET, FILM COATED ORAL at 10:43

## 2021-04-26 RX ADMIN — TOPIRAMATE 100 MG: 100 TABLET, FILM COATED ORAL at 10:43

## 2021-04-26 RX ADMIN — Medication 10 ML: at 10:44

## 2021-04-26 RX ADMIN — PANTOPRAZOLE SODIUM 40 MG: 40 TABLET, DELAYED RELEASE ORAL at 05:41

## 2021-04-26 RX ADMIN — CETIRIZINE HYDROCHLORIDE 10 MG: 10 TABLET, FILM COATED ORAL at 10:43

## 2021-04-26 RX ADMIN — ENOXAPARIN SODIUM 40 MG: 40 INJECTION SUBCUTANEOUS at 10:43

## 2021-04-26 RX ADMIN — PROPRANOLOL HYDROCHLORIDE 10 MG: 20 TABLET ORAL at 10:43

## 2021-04-26 ASSESSMENT — ENCOUNTER SYMPTOMS
EYE REDNESS: 0
CONSTIPATION: 0
EYE DISCHARGE: 0
DIARRHEA: 0
ABDOMINAL PAIN: 0
CHEST TIGHTNESS: 0
COUGH: 0
ABDOMINAL DISTENTION: 0
VOMITING: 0
NAUSEA: 0
SHORTNESS OF BREATH: 0
FACIAL SWELLING: 0
BLOOD IN STOOL: 0
PHOTOPHOBIA: 0

## 2021-04-26 ASSESSMENT — PAIN SCALES - GENERAL
PAINLEVEL_OUTOF10: 0
PAINLEVEL_OUTOF10: 0

## 2021-04-26 NOTE — CARE COORDINATION
Patient discharging home with   [facility]  Phone: [phone]  Fax: [phone]     Order/ AVS faxed and agency notifed. No other SW needs at this time.     All discharge needs met per case management    Madhuri Smallwood MSW, [address]

## 2021-04-26 NOTE — PROGRESS NOTES
VSS, assessment as charted. Reports level 8 headache \"all over\", Tylenol administered with scheduled HS meds. Correction insulin administered for FSBS 187, sitting up in bed playing [employer] with daughter, denies other needs @present.

## 2021-04-26 NOTE — PROGRESS NOTES
[facility] Note    Patient Active Problem List   Diagnosis    Dizziness    Chest pain    Depression    Essential hypertension    GERD (gastroesophageal reflux disease)    Type 2 diabetes mellitus without complication, without long-term current use of insulin (HCC)    Hyponatremia    Severe malnutrition (HCC)    Witnessed seizure-like activity (HCC)       Past Medical History:   has a past medical history of Arthritis, Depression, Diabetes mellitus ([address] Fibromyalgia, GERD (gastroesophageal reflux disease), Hemorrhoid, Hyperlipidemia, Hypertension, Lower back pain, Migraine, and Thyroid disease. Past Social History:   reports that she has never smoked. She has never used smokeless tobacco. She reports that she does not drink alcohol or use drugs. Subjective:   Not verbalizing any complaints. Review of Systems   Constitutional: Positive for fatigue. Negative for activity change, appetite change, chills, fever and unexpected weight change. HENT: Negative for congestion and facial swelling. Eyes: Negative for photophobia, discharge and redness. Respiratory: Negative for cough, chest tightness and shortness of breath. Cardiovascular: Negative for chest pain, palpitations and leg swelling. Gastrointestinal: Negative for abdominal distention, abdominal pain, blood in stool, constipation, diarrhea, nausea and vomiting. Endocrine: Negative for cold intolerance, heat intolerance and polyuria. Genitourinary: Negative for decreased urine volume, difficulty urinating, flank pain and hematuria. Musculoskeletal: Negative for joint swelling and neck pain. Neurological: Negative for dizziness, seizures, syncope, speech difficulty, light-headedness and headaches. Hematological: Does not bruise/bleed easily. Psychiatric/Behavioral: Negative for agitation, confusion and hallucinations.        Objective:      /67   Pulse 74 Temp 98 °F (36.7 °C) (Oral)   Resp 16   Ht 5' 2\" (1.575 m)   Wt 108 lb 11.2 oz (49.3 kg)   SpO2 99%   BMI 19.88 kg/m²     Wt Readings from Last 3 Encounters:   04/26/21 108 lb 11.2 oz (49.3 kg)   02/14/21 122 lb (55.3 kg)   11/18/20 135 lb (61.2 kg)       BP Readings from Last 3 Encounters:   04/26/21 116/67   02/15/21 126/81   11/19/20 (!) 150/106     Chest- clear  Heart-regular  Abd-soft  Ext- no edema    Labs  Hemoglobin   Date Value Ref Range Status   04/23/2021 13.8 12.0 - 16.0 g/dL Final     Hematocrit   Date Value Ref Range Status   04/23/2021 40.1 36.0 - 48.0 % Final     WBC   Date Value Ref Range Status   04/23/2021 8.2 4.0 - 11.0 K/uL Final     Platelets   Date Value Ref Range Status   04/23/2021 376 135 - 450 K/uL Final     Lab Results   Component Value Date    CREATININE <0.5 (L) 04/26/2021    BUN 5 (L) 04/26/2021     04/26/2021    K 3.7 04/26/2021     (H) 04/26/2021    CO2 22 04/26/2021     [address]  [address]    IMPRESSION / RECOMMENDATIONS:      Hyponatremia, moderate range, now improved to 142. Rate of correction acceptable.   -Likely acute, as recent labs about 2 months ago showed normal Na, no history of hyponatremia in the past,  -With neurological symptoms such as seizures, weakness, with history of seizures in the past,  -Risk factors- ?  Hypovolemia, hypokalemia, hydrochlorothiazide, SSRI use  -Lowest Na 122, currently 137. Stopped IVF. Rate of correction adequate. -Goal serum sodium increase around 8-10 points in 24 hours    BP lower limits- just on low dose Metoprolol. Now better. She should avoid thiazides.       D/C plan from renal stand point:  - okay for D/C from renal perspective once okay with Neurology and Medicine  - will sign off. Call if needed.   Thank you.         Zoë Zuleta MD

## 2021-04-26 NOTE — PLAN OF CARE
Problem: Pain:  Goal: Pain level will decrease  Description: Pain level will decrease  4/26/2021 5122 by Vickie Max RN  Outcome: Ongoing  Note: Medicated x1 this shift for report of headache with relief sufficient for sleep.

## 2021-04-26 NOTE — DISCHARGE SUMMARY
[facility] DISCHARGE SUMMARY    Patient Demographics    Patient. Omer Emanuel  Date of Birth. 1975  MRN. [id number]     Primary care provider. Laverne Garcia  (Tel: [phone])    Admit date: 4/22/2021    Discharge date (blank if same as Note Date): Note Date: 4/26/2021     Reason for Hospitalization. Chief Complaint   Patient presents with    Altered Mental Status     pt brought in by family with c/o headache and altered mental status. daughter states pt not acting herself. states dad described a seizure like activity 2 days ago            [address] [place] [address] Dr Rubalcava. 65-year-old female who was admitted for acute encephalopathy secondary to hyponatremia with sodium of 122 likely culprit secondary to hydrochlorothiazide use and poor p.o. intake. Patient was eval by nephrology was treated with IV fluid hyponatremia since resolved. Patient also with severe malnutrition secondary to poor p.o. intake since Covid diagnosis. Patient also found to have hyperthyroidism which is known for him. Likely has tremors from hyperthyroidism patient medication was readjusted      Hyponatremia  -Secondary to hydrochlorothiazide which was discontinued  -Blood pressure is stable so no additional medication is needed    Tremors  -CT MRI head is negative.  -This is likely secondary to abnormal thyroid patient likely has hyperthyroidism  -Patient has been started on atenolol for symptomatic treatment for now  -We will need outpatient follow-up  -Has known history of hyperthyroidism    Malnutrition  -With severe deconditioning.  -Needs rehab. Consults. IP CONSULT TO NEPHROLOGY  IP CONSULT TO SPIRITUAL SERVICES  IP CONSULT TO DIETITIAN  IP CONSULT TO NEUROLOGY  IP CONSULT TO PHYSICAL MEDICINE REHAB    Physical examination on discharge day.    /72   Pulse 79   Temp 98.6 °F (37 °C) (Oral)   Resp 16 Ht 5' 2\" (1.575 m)   Wt 108 lb 11.2 oz (49.3 kg)   SpO2 96%   BMI 19.88 kg/m²   General appearance. Alert. Looks comfortable. HEENT. Sclera clear. Moist mucus membranes. Cardiovascular. Regular rate and rhythm, normal S1, S2. No murmur. Respiratory. Not using accessory muscles. Clear to auscultation bilaterally, no wheeze. Gastrointestinal. Abdomen soft, non-tender, not distended, normal bowel sounds  Neurology. Facial symmetry. No speech deficits. Moving all extremities equally. Extremities. No edema in lower extremities. Skin. Warm, dry, normal turgor    Condition at time of discharge stable     Medication instructions provided to patient at discharge.      Medication List      START taking these medications    propranolol 10 MG tablet  Commonly known as: INDERAL  Take 1 tablet by mouth 2 times daily        CONTINUE taking these medications    amitriptyline 10 MG tablet  Commonly known as: ELAVIL     atorvastatin 20 MG tablet  Commonly known as: LIPITOR     DULoxetine 30 MG extended release capsule  Commonly known as: CYMBALTA     fluticasone 50 MCG/ACT nasal spray  Commonly known as: Flonase  2 sprays by Nasal route daily     loratadine 10 MG capsule  Commonly known as: CLARITIN     melatonin 3 MG Tabs tablet     metFORMIN 500 MG tablet  Commonly known as: GLUCOPHAGE     pantoprazole sodium 40 MG Pack packet  Commonly known as: PROTONIX     polyethylene glycol 17 g packet  Commonly known as: GLYCOLAX     SUMAtriptan 25 MG tablet  Commonly known as: IMITREX     therapeutic multivitamin-minerals tablet     tiZANidine 2 MG tablet  Commonly known as: ZANAFLEX     topiramate 100 MG tablet  Commonly known as: TOPAMAX     vitamin D 25 MCG (1000 UT) Caps        STOP taking these medications    hydroCHLOROthiazide 25 MG tablet  Commonly known as: HYDRODIURIL     metoprolol succinate 25 MG extended release tablet  Commonly known as: TOPROL XL     omeprazole 20 MG delayed release capsule  Commonly known as: PRILOSEC     ondansetron 4 MG tablet  Commonly known as: Zofran     traMADol 50 MG tablet  Commonly known as: Ultram           Where to Get Your Medications      These medications were sent to [facility], [address] [place] [address] 37, [address]    Phone: [phone]   · propranolol 10 MG tablet         Discharge recommendations given to patient. Follow Up. pcp in 1 week   Disposition. Rehab  Activity. activity as tolerated  Diet: DIET GENERAL; Vegetarian (Lacto-Ovo)  Dietary Nutrition Supplements: Standard High Calorie Oral Supplement      Spent 45  minutes in discharge process.     Signed:  Elizabeth Head MD     4/26/2021 12:47 PM

## 2021-04-26 NOTE — PROGRESS NOTES
(LOVENOX) injection 40 mg  40 mg Subcutaneous Daily Arnold Newsome PA-C   40 mg at 04/26/21 1043    magnesium hydroxide (MILK OF MAGNESIA) 400 MG/5ML suspension 30 mL  30 mL Oral Daily PRN Arnold Newsome PA-C   30 mL at 04/26/21 1043    acetaminophen (TYLENOL) tablet 650 mg  650 mg Oral Q6H PRN MARCELLUS MetcalfC   650 mg at 04/25/21 2117    Or    acetaminophen (TYLENOL) suppository 650 mg  650 mg Rectal Q6H PRN Arnold Newsome PA-C        ondansetron [facility]) injection 4 mg  4 mg Intravenous Q6H PRN Arnold Newsome PA-C   4 mg at 04/23/21 2231    insulin lispro (1 Unit Dial) 0-6 Units  0-6 Units Subcutaneous TID WC Arnold Newsome PA-C        insulin lispro (1 Unit Dial) 0-3 Units  0-3 Units Subcutaneous Nightly Arnold Newsome PA-C   1 Units at 04/25/21 2115    glucose (GLUTOSE) 40 % oral gel 15 g  15 g Oral PRN Arnold Newsome PA-C        dextrose 50 % IV solution  12.5 g Intravenous PRN Arnold Newsome PA-C        glucagon (rDNA) injection 1 mg  1 mg Intramuscular PRN Arnold Newsome PA-C        dextrose 5 % solution  100 mL/hr Intravenous PRN Arnold Newsome PA-C        LORazepam (ATIVAN) injection 2 mg  2 mg Intravenous Q4H PRN Arnold Newsome PA-C        atorvastatin (LIPITOR) tablet 20 mg  20 mg Oral Daily Mariano Guillen MD   20 mg at 04/26/21 1043    hydrocortisone (ANUSOL-HC) 2.5 % rectal cream   Rectal BID PRN Arnold Newsome PA-C   Given at 04/24/21 1147     Allergies   Allergen Reactions    Asa [Aspirin] Nausea Only     Past Medical History:   Diagnosis Date    Arthritis     Depression     Diabetes mellitus ([address]     Fibromyalgia     GERD (gastroesophageal reflux disease)     Hemorrhoid     Hyperlipidemia     Hypertension     Lower back pain     Migraine     Thyroid disease          Objective:  Exam:   Constitutional:   Vitals:    04/26/21 0120 04/26/21 0540 04/26/21 0806 04/26/21 1215   BP: 105/70 116/67 118/69 122/72   Pulse: 76 74 75 79   Resp: 16 16 18 16   Temp: 98.3 °F (36.8 °C) 98 °F (36.7 °C) 98.5 °F (36.9 °C) 98.6 °F (37 °C)   TempSrc: Oral Oral Oral Oral   SpO2: 99% 99% 98% 96%   Weight:   108 lb 11.2 oz (49.3 kg)    Height:         General appearance: No acute distress. Mental Status: Per her daughter  Oriented to person, place, problem, and time. Memory: Good immediate recall. Intact remote memory  Normal attention span and concentration. Language: intact naming, repeating and fluency   Good fund of Knowledge. Cranial Nerves:   II:   Pupils: equal, round, reactive to light  III,IV,VI: Extra Ocular Movements are intact. No nystagmus  V: Facial sensation is intact  VII: Facial strength and movements: intact and symmetric  XII: Tongue movements are normal  Musculoskeletal: Poor effort but no focal weakness  Normal tone  No tremors today. Normal sensation        Data:  LABS:   Lab Results   Component Value Date     04/26/2021    K 3.7 04/26/2021    K 3.1 04/22/2021     04/26/2021    CO2 22 04/26/2021    BUN 5 04/26/2021    CREATININE <0.5 04/26/2021    GFRAA >60 04/26/2021    LABGLOM >60 04/26/2021    GLUCOSE 93 04/26/2021    PHOS 3.8 04/26/2021    MG 1.90 04/22/2021    CALCIUM 9.1 04/26/2021     Lab Results   Component Value Date    WBC 8.2 04/23/2021    RBC 4.63 04/23/2021    HGB 13.8 04/23/2021    HCT 40.1 04/23/2021    MCV 86.6 04/23/2021    RDW 12.3 04/23/2021     04/23/2021     Lab Results   Component Value Date    INR 0.99 04/22/2021    PROTIME 11.5 04/22/2021       Reviewed notes from different physician  Reviewed recent blood test  Discussed plan with her daughter      Impression:  Acute encephalopathy and possible new onset seizure versus tremors. \Hyponatremia.     Improved  Chronic migraine with aura  Hypertension  Diabetes, not controlled  Hyperlipidemia  Generalized weakness and deconditioning with malnutrition    Recommendation    Continue current supportive care  The patient is on Topamax 100 mg twice daily which can suppress her appetite. Discussed risk of malnutrition and decreased appetite with the patient's daughter. Insulin sliding scale  Nutrition support  PT and OT  Continue current medical management  No need to start medication for her tremors at this point unless symptoms recur  Nothing to add from neurology at this point  We will sign off. Kennith Frankel, MD   [phone]      This dictation was generated by voice recognition computer software. Although all attempts are made to edit the dictation for accuracy, there may be errors in the transcription that are not intended.

## 2021-04-26 NOTE — PROGRESS NOTES
CLINICAL PHARMACY NOTE: MEDS TO [address] [place] [address] Drive Select Patient?: No  Total # of Prescriptions Filled: 1   The following medications were delivered to the patient:  · Propranolol 10mg  Total # of Interventions Completed: 0  Time Spent (min): 30    Additional Documentation:  Medication delivered- patient signed  Joyce Gomez

## 2021-04-26 NOTE — DISCHARGE INSTR - COC
Continuity of Care Form    Patient Name: Aubree Paniagua   :  1975  MRN:  [id number]    Admit date:  2021  Discharge date:  ***    Code Status Order: Full Code   Advance Directives:   Advance Care Flowsheet Documentation       Date/Time Healthcare Directive Type of Healthcare Directive Copy in [address] Agent's Name Healthcare Agent's Phone Number    21 0143  No, patient does not have an advance directive for healthcare treatment -- -- -- -- --            Admitting Physician:  Jai Berman MD  PCP: Christina Decker    Discharging Nurse: [facility] Unit/Room#: 5BE-0816/7492-37  Discharging Unit Phone Number: ***    Emergency Contact:   Extended Emergency Contact Information  Primary Emergency Contact: [facility] Phone: [phone]  Relation: Child  Secondary Emergency Contact: Anca Murguia  Address: [address] [place] [address], [address] [place] [address],[address] Phone: [phone]  Relation: Spouse    Past Surgical History:  Past Surgical History:   Procedure Laterality Date    ABDOMEN SURGERY      BACK SURGERY      4 nodules removed       Immunization History: There is no immunization history on file for this patient.     Active Problems:  Patient Active Problem List   Diagnosis Code    Dizziness R42    Chest pain R07.9    Depression F32.9    Essential hypertension I10    GERD (gastroesophageal reflux disease) K21.9    Type 2 diabetes mellitus without complication, without long-term current use of insulin ([facility]) E11.9    Hyponatremia E87.1    Severe malnutrition ([address] E43    Witnessed seizure-like activity ([address] R56.9       Isolation/Infection:   Isolation            No Isolation          Patient Infection Status       Infection Onset Added Last Indicated Last Indicated By Review Planned Expiration Resolved Resolved By    None active    Resolved    COVID-19 Rule Out 21 COVID-19, Rapid (Ordered)   04/22/21 Rule-Out Test Resulted    COVID-19 Rule Out 10/13/20 10/13/20 10/13/20 COVID-19 (Ordered)   10/13/20 Rule-Out Test Resulted            Nurse Assessment:  Last Vital Signs: /72   Pulse 79   Temp 98.6 °F (37 °C) (Oral)   Resp 16   Ht 5' 2\" (1.575 m)   Wt 108 lb 11.2 oz (49.3 kg)   SpO2 96%   BMI 19.88 kg/m²     Last documented pain score (0-10 scale): Pain Level: 0  Last Weight:   Wt Readings from Last 1 Encounters:   04/26/21 108 lb 11.2 oz (49.3 kg)     Mental Status:  {IP PT MENTAL STATUS:20030}    IV Access:  { CRISTINA IV ACCESS:761743740}    Nursing Mobility/ADLs:  Walking   {P DME EOAJ:372698209}  Transfer  {P DME ROIM:848562287}  Bathing  {P DME RYUS:932337233}  Dressing  {P DME NPMO:103319939}  Toileting  {P DME JYRJ:695212499}  Feeding  {[facility] DME YYZJ:131429862}  Med Admin  {[facility] DME FCPK:292146152}  Med Delivery   { CRISTINA MED Delivery:519364816}    Wound Care Documentation and Therapy:        Elimination:  Continence: Bowel: {YES / MI:79988}  Bladder: {YES / NV:91488}  Urinary Catheter: {Urinary Catheter:688771897}   Colostomy/Ileostomy/Ileal Conduit: {YES / ZK:44518}       Date of Last BM: ***    Intake/Output Summary (Last 24 hours) at 4/26/2021 1352  Last data filed at 4/26/2021 1239  Gross per 24 hour   Intake 120 ml   Output --   Net 120 ml     No intake/output data recorded.     Safety Concerns:     [address] Safety Concerns:820376133}    Impairments/Disabilities:      [address] [facility] Impairments/Disabilities:405530360}    Nutrition Therapy:  Current Nutrition Therapy:   [address] [facility] Diet List:234215147}    Routes of Feeding: {[facility] DME Other Feedings:505909386}  Liquids: {Slp liquid thickness:84086}  Daily Fluid Restriction: {CHP DME Yes amt example:151929041}  Last Modified Barium Swallow with Video (Video Swallowing Test): {Done Not Done SDYM:951852831}    Treatments at the Time of Hospital Discharge:   Respiratory Treatments: ***  Oxygen Therapy:  {Therapy; copd oxygen:07897}  Ventilator:    {MH CC Vent RHZS:905820980}    Rehab Therapies: {THERAPEUTIC INTERVENTION:7471338626}  Weight Bearing Status/Restrictions: [address] CC Weight Bearin}  Other Medical Equipment (for information only, NOT a DME order):  {EQUIPMENT:342326175}  Other Treatments: ***    Patient's personal belongings (please select all that are sent with patient):  {CHP DME Belongings:000568905}    RN SIGNATURE:  {Esignature:536507975}    CASE MANAGEMENT/SOCIAL WORK SECTION    Inpatient Status Date: 21    Readmission Risk Assessment Score:  Readmission Risk              Risk of Unplanned Readmission:        18           Discharging to Facility/ Serena Mendez  Phone: [phone]  Fax: [phone]      / signature: Tg Zavala [facility]  [phone]      PHYSICIAN SECTION    Prognosis: Good    Condition at Discharge: Stable    Rehab Potential (if transferring to Rehab): Good    Recommended Labs or Other Treatments After Discharge:     Physician Certification: I certify the above information and transfer of Portia King  is necessary for the continuing treatment of the diagnosis listed and that she requires Home Care for greater 30 days.      Update Admission H&P: No change in H&P    PHYSICIAN SIGNATURE:  Electronically signed by Petty Mayberry MD on 21 at 2:15 PM EDT

## 2021-04-30 ENCOUNTER — HOSPITAL ENCOUNTER (OUTPATIENT)
Age: 46
Discharge: HOME | End: 2021-04-30
Payer: COMMERCIAL

## 2021-04-30 DIAGNOSIS — N18.9 ANEMIA IN CHRONIC KIDNEY DISEASE, UNSPECIFIED CKD STAGE: ICD-10-CM

## 2021-04-30 DIAGNOSIS — D63.1 ANEMIA IN CHRONIC KIDNEY DISEASE, UNSPECIFIED CKD STAGE: ICD-10-CM

## 2021-04-30 LAB
ALBUMIN SERPL-MCNC: 4.1 G/DL (ref 3.4–5)
ANION GAP SERPL CALCULATED.3IONS-SCNC: 11 MMOL/L (ref 3–16)
BUN BLDV-MCNC: 7 MG/DL (ref 7–20)
CALCIUM SERPL-MCNC: 9 MG/DL (ref 8.3–10.6)
CHLORIDE BLD-SCNC: 99 MMOL/L (ref 99–110)
CO2: 25 MMOL/L (ref 21–32)
CREAT SERPL-MCNC: 0.6 MG/DL (ref 0.6–1.1)
CREATININE URINE: 29.6 MG/DL (ref 28–259)
GFR AFRICAN AMERICAN: >60
GFR NON-AFRICAN AMERICAN: >60
GLUCOSE BLD-MCNC: 134 MG/DL (ref 70–99)
HCT VFR BLD CALC: 37.7 % (ref 36–48)
HEMOGLOBIN: 13.1 G/DL (ref 12–16)
MCH RBC QN AUTO: 30.7 PG (ref 26–34)
MCHC RBC AUTO-ENTMCNC: 34.7 G/DL (ref 31–36)
MCV RBC AUTO: 88.5 FL (ref 80–100)
PDW BLD-RTO: 12.9 % (ref 12.4–15.4)
PHOSPHORUS: 4 MG/DL (ref 2.5–4.9)
PLATELET # BLD: 414 K/UL (ref 135–450)
PMV BLD AUTO: 8.5 FL (ref 5–10.5)
POTASSIUM SERPL-SCNC: 3.7 MMOL/L (ref 3.5–5.1)
PROTEIN PROTEIN: 5 MG/DL
RBC # BLD: 4.26 M/UL (ref 4–5.2)
SODIUM BLD-SCNC: 135 MMOL/L (ref 136–145)
WBC # BLD: 10 K/UL (ref 4–11)

## 2021-04-30 PROCEDURE — 36415 COLL VENOUS BLD VENIPUNCTURE: CPT

## 2021-04-30 PROCEDURE — 82570 ASSAY OF URINE CREATININE: CPT

## 2021-04-30 PROCEDURE — 80069 RENAL FUNCTION PANEL: CPT

## 2021-04-30 PROCEDURE — 84156 ASSAY OF PROTEIN URINE: CPT

## 2021-04-30 PROCEDURE — 85027 COMPLETE CBC AUTOMATED: CPT

## 2021-07-28 ENCOUNTER — HOSPITAL ENCOUNTER (OUTPATIENT)
Age: 46
Discharge: HOME | End: 2021-07-28
Payer: COMMERCIAL

## 2021-07-28 DIAGNOSIS — E87.1 HYPONATREMIA: ICD-10-CM

## 2021-07-28 DIAGNOSIS — I10 ESSENTIAL HYPERTENSION: ICD-10-CM

## 2021-07-28 LAB
ALBUMIN SERPL-MCNC: 4.1 G/DL (ref 3.4–5)
ANION GAP SERPL CALCULATED.3IONS-SCNC: 11 MMOL/L (ref 3–16)
BUN BLDV-MCNC: 5 MG/DL (ref 7–20)
CALCIUM SERPL-MCNC: 8.9 MG/DL (ref 8.3–10.6)
CHLORIDE BLD-SCNC: 114 MMOL/L (ref 99–110)
CO2: 20 MMOL/L (ref 21–32)
CREAT SERPL-MCNC: 0.6 MG/DL (ref 0.6–1.1)
CREATININE URINE: 30.4 MG/DL (ref 28–259)
GFR AFRICAN AMERICAN: >60
GFR NON-AFRICAN AMERICAN: >60
GLUCOSE BLD-MCNC: 105 MG/DL (ref 70–99)
HCT VFR BLD CALC: 36.4 % (ref 36–48)
HEMOGLOBIN: 12.2 G/DL (ref 12–16)
MCH RBC QN AUTO: 30.8 PG (ref 26–34)
MCHC RBC AUTO-ENTMCNC: 33.4 G/DL (ref 31–36)
MCV RBC AUTO: 92.3 FL (ref 80–100)
PDW BLD-RTO: 14.8 % (ref 12.4–15.4)
PHOSPHORUS: 3.9 MG/DL (ref 2.5–4.9)
PLATELET # BLD: 188 K/UL (ref 135–450)
PMV BLD AUTO: 11.3 FL (ref 5–10.5)
POTASSIUM SERPL-SCNC: 4.4 MMOL/L (ref 3.5–5.1)
PROTEIN PROTEIN: 8 MG/DL
RBC # BLD: 3.94 M/UL (ref 4–5.2)
SODIUM BLD-SCNC: 145 MMOL/L (ref 136–145)
WBC # BLD: 8.5 K/UL (ref 4–11)

## 2021-07-28 PROCEDURE — 85027 COMPLETE CBC AUTOMATED: CPT

## 2021-07-28 PROCEDURE — 84156 ASSAY OF PROTEIN URINE: CPT

## 2021-07-28 PROCEDURE — 80069 RENAL FUNCTION PANEL: CPT

## 2021-07-28 PROCEDURE — 36415 COLL VENOUS BLD VENIPUNCTURE: CPT

## 2021-07-28 PROCEDURE — 82570 ASSAY OF URINE CREATININE: CPT

## 2021-09-07 ENCOUNTER — HOSPITAL ENCOUNTER (OUTPATIENT)
Age: 46
Discharge: HOME | End: 2021-09-07
Payer: COMMERCIAL

## 2021-09-07 DIAGNOSIS — E87.1 HYPONATREMIA: ICD-10-CM

## 2021-09-07 LAB
ALBUMIN SERPL-MCNC: 4.4 G/DL (ref 3.4–5)
ANION GAP SERPL CALCULATED.3IONS-SCNC: 15 MMOL/L (ref 3–16)
BUN BLDV-MCNC: 7 MG/DL (ref 7–20)
CALCIUM SERPL-MCNC: 9 MG/DL (ref 8.3–10.6)
CHLORIDE BLD-SCNC: 107 MMOL/L (ref 99–110)
CO2: 21 MMOL/L (ref 21–32)
CREAT SERPL-MCNC: 0.7 MG/DL (ref 0.6–1.1)
CREATININE URINE: 26.4 MG/DL (ref 28–259)
GFR AFRICAN AMERICAN: >60
GFR NON-AFRICAN AMERICAN: >60
GLUCOSE BLD-MCNC: 118 MG/DL (ref 70–99)
HCT VFR BLD CALC: 39.9 % (ref 36–48)
HEMOGLOBIN: 13.2 G/DL (ref 12–16)
MCH RBC QN AUTO: 31.1 PG (ref 26–34)
MCHC RBC AUTO-ENTMCNC: 33.1 G/DL (ref 31–36)
MCV RBC AUTO: 93.9 FL (ref 80–100)
PDW BLD-RTO: 13.7 % (ref 12.4–15.4)
PHOSPHORUS: 3.7 MG/DL (ref 2.5–4.9)
PLATELET # BLD: 224 K/UL (ref 135–450)
PMV BLD AUTO: 10.8 FL (ref 5–10.5)
POTASSIUM SERPL-SCNC: 4 MMOL/L (ref 3.5–5.1)
PROTEIN PROTEIN: <4 MG/DL
RBC # BLD: 4.25 M/UL (ref 4–5.2)
SODIUM BLD-SCNC: 143 MMOL/L (ref 136–145)
WBC # BLD: 7.4 K/UL (ref 4–11)

## 2021-09-07 PROCEDURE — 80069 RENAL FUNCTION PANEL: CPT

## 2021-09-07 PROCEDURE — 85027 COMPLETE CBC AUTOMATED: CPT

## 2021-09-07 PROCEDURE — 82570 ASSAY OF URINE CREATININE: CPT

## 2021-09-07 PROCEDURE — 84156 ASSAY OF PROTEIN URINE: CPT

## 2021-09-07 PROCEDURE — 36415 COLL VENOUS BLD VENIPUNCTURE: CPT

## 2024-02-10 ENCOUNTER — APPOINTMENT (OUTPATIENT)
Dept: GENERAL RADIOLOGY | Age: 49
End: 2024-02-10

## 2024-02-10 ENCOUNTER — APPOINTMENT (OUTPATIENT)
Dept: CT IMAGING | Age: 49
End: 2024-02-10

## 2024-02-10 ENCOUNTER — HOSPITAL ENCOUNTER (EMERGENCY)
Age: 49
Discharge: HOME | End: 2024-02-11

## 2024-02-10 VITALS
OXYGEN SATURATION: 98 % | HEART RATE: 89 BPM | DIASTOLIC BLOOD PRESSURE: 93 MMHG | RESPIRATION RATE: 16 BRPM | WEIGHT: 130 LBS | TEMPERATURE: 98.4 F | BODY MASS INDEX: 24.56 KG/M2 | SYSTOLIC BLOOD PRESSURE: 149 MMHG

## 2024-02-10 DIAGNOSIS — S39.012A BACK STRAIN, INITIAL ENCOUNTER: ICD-10-CM

## 2024-02-10 DIAGNOSIS — R55 SYNCOPE AND COLLAPSE: Primary | ICD-10-CM

## 2024-02-10 DIAGNOSIS — S09.90XA CLOSED HEAD INJURY, INITIAL ENCOUNTER: ICD-10-CM

## 2024-02-10 DIAGNOSIS — S49.90XA INJURY OF UPPER EXTREMITY, UNSPECIFIED LATERALITY, INITIAL ENCOUNTER: ICD-10-CM

## 2024-02-10 DIAGNOSIS — S16.1XXA STRAIN OF NECK MUSCLE, INITIAL ENCOUNTER: ICD-10-CM

## 2024-02-10 DIAGNOSIS — S70.02XA CONTUSION OF LEFT HIP, INITIAL ENCOUNTER: ICD-10-CM

## 2024-02-10 DIAGNOSIS — R26.2 DIFFICULTY IN WALKING: ICD-10-CM

## 2024-02-10 DIAGNOSIS — R42 DIZZINESS: ICD-10-CM

## 2024-02-10 LAB
ALBUMIN SERPL-MCNC: 4.7 G/DL (ref 3.4–5)
ALBUMIN/GLOB SERPL: 1.6 {RATIO} (ref 1.1–2.2)
ALP SERPL-CCNC: 89 U/L (ref 40–129)
ALT SERPL-CCNC: 23 U/L (ref 10–40)
ANION GAP SERPL CALCULATED.3IONS-SCNC: 13 MMOL/L (ref 3–16)
AST SERPL-CCNC: 31 U/L (ref 15–37)
BASOPHILS # BLD: 0.1 K/UL (ref 0–0.2)
BASOPHILS NFR BLD: 0.6 %
BILIRUB SERPL-MCNC: 0.6 MG/DL (ref 0–1)
BUN SERPL-MCNC: 10 MG/DL (ref 7–20)
CALCIUM SERPL-MCNC: 9.6 MG/DL (ref 8.3–10.6)
CHLORIDE SERPL-SCNC: 103 MMOL/L (ref 99–110)
CO2 SERPL-SCNC: 23 MMOL/L (ref 21–32)
CREAT SERPL-MCNC: 0.6 MG/DL (ref 0.6–1.1)
DEPRECATED RDW RBC AUTO: 15.1 % (ref 12.4–15.4)
EOSINOPHIL # BLD: 0.1 K/UL (ref 0–0.6)
EOSINOPHIL NFR BLD: 0.8 %
GFR SERPLBLD CREATININE-BSD FMLA CKD-EPI: >60 ML/MIN/{1.73_M2}
GLUCOSE SERPL-MCNC: 183 MG/DL (ref 70–99)
HCG SERPL QL: NEGATIVE
HCT VFR BLD AUTO: 41.8 % (ref 36–48)
HGB BLD-MCNC: 13.7 G/DL (ref 12–16)
LYMPHOCYTES # BLD: 3.7 K/UL (ref 1–5.1)
LYMPHOCYTES NFR BLD: 29.2 %
MAGNESIUM SERPL-MCNC: 2.1 MG/DL (ref 1.8–2.4)
MCH RBC QN AUTO: 28.2 PG (ref 26–34)
MCHC RBC AUTO-ENTMCNC: 32.6 G/DL (ref 31–36)
MCV RBC AUTO: 86.4 FL (ref 80–100)
MONOCYTES # BLD: 0.8 K/UL (ref 0–1.3)
MONOCYTES NFR BLD: 5.9 %
NEUTROPHILS # BLD: 8.1 K/UL (ref 1.7–7.7)
NEUTROPHILS NFR BLD: 63.5 %
NT-PROBNP SERPL-MCNC: <36 PG/ML (ref 0–124)
PLATELET # BLD AUTO: 377 K/UL (ref 135–450)
PMV BLD AUTO: 9.7 FL (ref 5–10.5)
POTASSIUM SERPL-SCNC: 4 MMOL/L (ref 3.5–5.1)
PROT SERPL-MCNC: 7.7 G/DL (ref 6.4–8.2)
RBC # BLD AUTO: 4.84 M/UL (ref 4–5.2)
SODIUM SERPL-SCNC: 139 MMOL/L (ref 136–145)
TROPONIN, HIGH SENSITIVITY: <6 NG/L (ref 0–14)
WBC # BLD AUTO: 12.8 K/UL (ref 4–11)

## 2024-02-10 PROCEDURE — 73030 X-RAY EXAM OF SHOULDER: CPT

## 2024-02-10 PROCEDURE — 72100 X-RAY EXAM L-S SPINE 2/3 VWS: CPT

## 2024-02-10 PROCEDURE — 70498 CT ANGIOGRAPHY NECK: CPT

## 2024-02-10 PROCEDURE — 93005 ELECTROCARDIOGRAM TRACING: CPT | Performed by: EMERGENCY MEDICINE

## 2024-02-10 PROCEDURE — 73080 X-RAY EXAM OF ELBOW: CPT

## 2024-02-10 PROCEDURE — 84703 CHORIONIC GONADOTROPIN ASSAY: CPT

## 2024-02-10 PROCEDURE — 85025 COMPLETE CBC W/AUTO DIFF WBC: CPT

## 2024-02-10 PROCEDURE — 83880 ASSAY OF NATRIURETIC PEPTIDE: CPT

## 2024-02-10 PROCEDURE — 73502 X-RAY EXAM HIP UNI 2-3 VIEWS: CPT

## 2024-02-10 PROCEDURE — 73110 X-RAY EXAM OF WRIST: CPT

## 2024-02-10 PROCEDURE — 71045 X-RAY EXAM CHEST 1 VIEW: CPT

## 2024-02-10 PROCEDURE — 6360000004 HC RX CONTRAST MEDICATION: Performed by: PHYSICIAN ASSISTANT

## 2024-02-10 PROCEDURE — 72125 CT NECK SPINE W/O DYE: CPT

## 2024-02-10 PROCEDURE — 84484 ASSAY OF TROPONIN QUANT: CPT

## 2024-02-10 PROCEDURE — 6370000000 HC RX 637 (ALT 250 FOR IP): Performed by: PHYSICIAN ASSISTANT

## 2024-02-10 PROCEDURE — 70450 CT HEAD/BRAIN W/O DYE: CPT

## 2024-02-10 PROCEDURE — 99285 EMERGENCY DEPT VISIT HI MDM: CPT

## 2024-02-10 PROCEDURE — 80053 COMPREHEN METABOLIC PANEL: CPT

## 2024-02-10 PROCEDURE — 83735 ASSAY OF MAGNESIUM: CPT

## 2024-02-10 RX ORDER — ACETAMINOPHEN 500 MG
500 TABLET ORAL ONCE
Status: COMPLETED | OUTPATIENT
Start: 2024-02-10 | End: 2024-02-10

## 2024-02-10 RX ORDER — MECLIZINE HCL 12.5 MG/1
25 TABLET ORAL ONCE
Status: COMPLETED | OUTPATIENT
Start: 2024-02-10 | End: 2024-02-10

## 2024-02-10 RX ADMIN — ACETAMINOPHEN 500 MG: 500 TABLET ORAL at 21:35

## 2024-02-10 RX ADMIN — MECLIZINE 25 MG: 12.5 TABLET ORAL at 21:35

## 2024-02-10 RX ADMIN — IOPAMIDOL 75 ML: 755 INJECTION, SOLUTION INTRAVENOUS at 22:37

## 2024-02-11 LAB
EKG ATRIAL RATE: 89 BPM
EKG DIAGNOSIS: NORMAL
EKG P AXIS: 26 DEGREES
EKG P-R INTERVAL: 142 MS
EKG Q-T INTERVAL: 362 MS
EKG QRS DURATION: 72 MS
EKG QTC CALCULATION (BAZETT): 440 MS
EKG R AXIS: -2 DEGREES
EKG T AXIS: 2 DEGREES
EKG VENTRICULAR RATE: 89 BPM

## 2024-02-11 PROCEDURE — 93010 ELECTROCARDIOGRAM REPORT: CPT | Performed by: INTERNAL MEDICINE

## 2024-02-11 PROCEDURE — 6370000000 HC RX 637 (ALT 250 FOR IP): Performed by: EMERGENCY MEDICINE

## 2024-02-11 RX ORDER — PROMETHAZINE HYDROCHLORIDE 25 MG/1
25 TABLET ORAL ONCE
Status: COMPLETED | OUTPATIENT
Start: 2024-02-11 | End: 2024-02-11

## 2024-02-11 RX ORDER — MECLIZINE HYDROCHLORIDE 25 MG/1
25 TABLET ORAL 3 TIMES DAILY PRN
Qty: 15 TABLET | Refills: 0 | Status: SHIPPED | OUTPATIENT
Start: 2024-02-11 | End: 2024-02-21

## 2024-02-11 RX ORDER — MECLIZINE HYDROCHLORIDE 25 MG/1
25 TABLET ORAL 3 TIMES DAILY PRN
Qty: 15 TABLET | Refills: 0 | Status: SHIPPED | OUTPATIENT
Start: 2024-02-11 | End: 2024-02-11

## 2024-02-11 RX ORDER — MECLIZINE HCL 12.5 MG/1
12.5 TABLET ORAL ONCE
Status: COMPLETED | OUTPATIENT
Start: 2024-02-11 | End: 2024-02-11

## 2024-02-11 RX ORDER — DIAZEPAM 2 MG/1
2 TABLET ORAL EVERY 6 HOURS PRN
Qty: 30 TABLET | Refills: 0 | Status: SHIPPED | OUTPATIENT
Start: 2024-02-11 | End: 2024-02-11

## 2024-02-11 RX ORDER — DIAZEPAM 2 MG/1
2 TABLET ORAL EVERY 6 HOURS PRN
Qty: 30 TABLET | Refills: 0 | Status: SHIPPED | OUTPATIENT
Start: 2024-02-11 | End: 2024-02-21

## 2024-02-11 RX ORDER — ACETAMINOPHEN 325 MG/1
650 TABLET ORAL ONCE
Status: COMPLETED | OUTPATIENT
Start: 2024-02-11 | End: 2024-02-11

## 2024-02-11 RX ORDER — PROMETHAZINE HYDROCHLORIDE 12.5 MG/1
12.5 TABLET ORAL 4 TIMES DAILY PRN
Qty: 20 TABLET | Refills: 0 | Status: SHIPPED | OUTPATIENT
Start: 2024-02-11 | End: 2024-02-18

## 2024-02-11 RX ORDER — DIAZEPAM 5 MG/1
10 TABLET ORAL ONCE
Status: COMPLETED | OUTPATIENT
Start: 2024-02-11 | End: 2024-02-11

## 2024-02-11 RX ORDER — PROMETHAZINE HYDROCHLORIDE 12.5 MG/1
12.5 TABLET ORAL 4 TIMES DAILY PRN
Qty: 20 TABLET | Refills: 0 | Status: SHIPPED | OUTPATIENT
Start: 2024-02-11 | End: 2024-02-11

## 2024-02-11 RX ADMIN — MECLIZINE 12.5 MG: 12.5 TABLET ORAL at 00:21

## 2024-02-11 RX ADMIN — PROMETHAZINE HYDROCHLORIDE 25 MG: 25 TABLET ORAL at 00:22

## 2024-02-11 RX ADMIN — DIAZEPAM 10 MG: 5 TABLET ORAL at 00:21

## 2024-02-11 RX ADMIN — ACETAMINOPHEN 650 MG: 325 TABLET ORAL at 01:26

## 2024-02-11 NOTE — ED PROVIDER NOTES
In addition to the advanced practice provider, I personally saw Anai Elam and performed a substantive portion of the visit including all aspects of the medical decision making.    Briefly, this is a 48 y.o. female here for to the ER for evaluation of acute on chronic vertigo she had a questionable near syncopal episode, she complains of intermittent dizziness no relief with home medications she has had multiple episodes of migraine and vertigo in the past.    On exam, no nystagmus no speech deficit no marked ataxia        Screenings  NIH Stroke Scale  Interval: Baseline  Level of Consciousness (1a): Alert  LOC Questions (1b): Answers both correctly  LOC Commands (1c): Performs both tasks correctly  Best Gaze (2): Normal  Visual (3): No visual loss  Facial Palsy (4): Normal symmetrical movement  Motor Arm, Left (5a): No drift  Motor Arm, Right (5b): No drift  Motor Leg, Left (6a): No drift  Motor Leg, Right (6b): No drift  Limb Ataxia (7): Absent  Sensory (8): Normal  Best Language (9): No aphasia  Dysarthria (10): Normal  Extinction and Inattention (11): No abnormality  Total: 0Glasgow Coma Scale  Eye Opening: Spontaneous  Best Verbal Response: Oriented  Best Motor Response: Obeys commands  Kenna Coma Scale Score: 15        MDM  Patient presents ER for evaluation of likely peripheral vertigo versus atypical migraine.  She has no clinical signs of stroke.  CT CT angio was normal.  She is to follow outpatient with PCP and her neurologist.  She was given antivertiginous medications with marked improvement in symptoms    Critical Care:  I have discussed the case with the advanced practice provider. I have personally performed a history, physical exam, and my own medical decision making. I have reviewed the note and agree with the findings and plan.    Upon my evaluation, this patient had a high probability of imminent or life-threatening deterioration due to vertigo, dizziness and stroke ro  which required my 
redness, itching and visual disturbance.   Respiratory:  Negative for cough, shortness of breath, wheezing and stridor.    Cardiovascular:  Negative for chest pain, palpitations and leg swelling.   Gastrointestinal:  Positive for nausea. Negative for abdominal pain, constipation, diarrhea and vomiting.   Endocrine: Negative.    Genitourinary: Negative.    Musculoskeletal:  Positive for myalgias and neck pain. Negative for back pain and neck stiffness.   Skin:  Negative for color change, pallor, rash and wound.   Neurological:  Positive for dizziness, syncope, light-headedness and headaches. Negative for tremors, seizures, facial asymmetry, speech difficulty, weakness and numbness.   Psychiatric/Behavioral:  Negative for confusion.    All other systems reviewed and are negative.      Positives and Pertinent negatives as per HPI.     SURGICAL HISTORY     Past Surgical History:   Procedure Laterality Date    ABDOMEN SURGERY      BACK SURGERY      4 nodules removed       CURRENTMEDICATIONS       Previous Medications    AMITRIPTYLINE (ELAVIL) 10 MG TABLET    Take 25 mg by mouth nightly     ATORVASTATIN (LIPITOR) 20 MG TABLET    Take 20 mg by mouth daily    DULOXETINE (CYMBALTA) 30 MG EXTENDED RELEASE CAPSULE    Take 30 mg by mouth daily    FLUTICASONE (FLONASE) 50 MCG/ACT NASAL SPRAY    2 sprays by Nasal route daily    LORATADINE (CLARITIN) 10 MG CAPSULE    Take 10 mg by mouth daily    MELATONIN 3 MG TABS TABLET    Take 3 mg by mouth nightly as needed    METFORMIN (GLUCOPHAGE) 500 MG TABLET    Take 500 mg by mouth 2 times daily (with meals)    MULTIPLE VITAMINS-MINERALS (THERAPEUTIC MULTIVITAMIN-MINERALS) TABLET    Take 1 tablet by mouth daily    PANTOPRAZOLE SODIUM (PROTONIX) 40 MG PACK PACKET    Take 40 mg by mouth every morning (before breakfast)    POLYETHYLENE GLYCOL (GLYCOLAX) 17 G PACKET    Take 17 g by mouth daily as needed for Constipation    PROPRANOLOL (INDERAL) 10 MG TABLET    Take 1 tablet by mouth 2 times

## 2024-02-11 NOTE — ED NOTES
Discharge instructions given, patient acknowledged understanding, rx given x3, patient ambulated out of ed upon discharge

## 2024-02-11 NOTE — ED NOTES
Pt able to stand and only walk a few steps without assistance before almost falling in the triage area.

## 2025-06-18 NOTE — PLAN OF CARE
Problem: Pain:  Goal: Pain level will decrease  Description: Pain level will decrease  4/26/2021 1220 by Garth Sánchez RN  Outcome: Ongoing  4/26/2021 0642 by Kirstie Blackwood RN  Outcome: Ongoing  Note: Medicated x1 this shift for report of headache with relief sufficient for sleep. DC instructions